# Patient Record
Sex: FEMALE | Race: WHITE | NOT HISPANIC OR LATINO | Employment: UNEMPLOYED | URBAN - NONMETROPOLITAN AREA
[De-identification: names, ages, dates, MRNs, and addresses within clinical notes are randomized per-mention and may not be internally consistent; named-entity substitution may affect disease eponyms.]

---

## 2023-05-01 ENCOUNTER — APPOINTMENT (OUTPATIENT)
Dept: PRIMARY CARE | Facility: CLINIC | Age: 48
End: 2023-05-01
Payer: COMMERCIAL

## 2023-05-01 ENCOUNTER — TELEPHONE (OUTPATIENT)
Dept: PRIMARY CARE | Facility: CLINIC | Age: 48
End: 2023-05-01

## 2023-05-01 RX ORDER — ASPIRIN 81 MG/1
81 TABLET ORAL DAILY
COMMUNITY

## 2023-05-01 RX ORDER — INSULIN LISPRO 100 [IU]/ML
20 INJECTION, SUSPENSION SUBCUTANEOUS
COMMUNITY
End: 2023-09-14 | Stop reason: SDUPTHER

## 2023-05-01 RX ORDER — ATORVASTATIN CALCIUM 80 MG/1
80 TABLET, FILM COATED ORAL DAILY
COMMUNITY
End: 2023-09-13 | Stop reason: SDUPTHER

## 2023-05-01 RX ORDER — METFORMIN HYDROCHLORIDE 500 MG/1
500 TABLET ORAL
COMMUNITY
End: 2023-06-13 | Stop reason: SDUPTHER

## 2023-05-01 RX ORDER — CLOPIDOGREL BISULFATE 75 MG/1
75 TABLET ORAL DAILY
COMMUNITY
End: 2023-06-13 | Stop reason: ALTCHOICE

## 2023-05-01 NOTE — TELEPHONE ENCOUNTER
Transition of Care    [facility]  Discharge diagnosis: Transient Stroke  Discharged to: Home  Discharge date: 4/24/23  Initial Call date: 4/26/23  Spoke with patient/caregiver: Patient  Left Message on date : 4/26/23                                                                  Left Message on date : 4/28/23  Do you need assistance  visits prior to your PCP visit: No  Home health care ordered: Yes  Have you been contacted by home care and have a start of care date: No  Are you taking medications as prescribed at discharge: Yes    Referral to [facility] Pharmacist: No  Patient advised to bring all medications to PCP follow-up appointment.  Patient advised to follow discharge instructions until provider follow-up.  TCM visit date: 5/2/23  TCM provider visit with: Eliana Knowles MD

## 2023-05-02 ENCOUNTER — OFFICE VISIT (OUTPATIENT)
Dept: PRIMARY CARE | Facility: CLINIC | Age: 48
End: 2023-05-02
Payer: COMMERCIAL

## 2023-05-02 VITALS
HEART RATE: 95 BPM | HEIGHT: 63 IN | WEIGHT: 252.8 LBS | SYSTOLIC BLOOD PRESSURE: 180 MMHG | TEMPERATURE: 97.1 F | DIASTOLIC BLOOD PRESSURE: 104 MMHG | BODY MASS INDEX: 44.79 KG/M2 | OXYGEN SATURATION: 97 %

## 2023-05-02 DIAGNOSIS — G45.9 TIA (TRANSIENT ISCHEMIC ATTACK): Primary | ICD-10-CM

## 2023-05-02 DIAGNOSIS — E78.00 HYPERCHOLESTEREMIA: ICD-10-CM

## 2023-05-02 DIAGNOSIS — R93.0 ABNORMAL MRI OF HEAD: ICD-10-CM

## 2023-05-02 DIAGNOSIS — E66.01 MORBID OBESITY WITH BMI OF 40.0-44.9, ADULT (MULTI): ICD-10-CM

## 2023-05-02 DIAGNOSIS — R29.818 SUSPECTED SLEEP APNEA: ICD-10-CM

## 2023-05-02 DIAGNOSIS — I10 HYPERTENSION, UNSPECIFIED TYPE: ICD-10-CM

## 2023-05-02 DIAGNOSIS — E11.65 UNCONTROLLED TYPE 2 DIABETES MELLITUS WITH HYPERGLYCEMIA (MULTI): ICD-10-CM

## 2023-05-02 PROCEDURE — 4010F ACE/ARB THERAPY RXD/TAKEN: CPT | Performed by: INTERNAL MEDICINE

## 2023-05-02 PROCEDURE — 3077F SYST BP >= 140 MM HG: CPT | Performed by: INTERNAL MEDICINE

## 2023-05-02 PROCEDURE — 3046F HEMOGLOBIN A1C LEVEL >9.0%: CPT | Performed by: INTERNAL MEDICINE

## 2023-05-02 PROCEDURE — 3080F DIAST BP >= 90 MM HG: CPT | Performed by: INTERNAL MEDICINE

## 2023-05-02 PROCEDURE — 1036F TOBACCO NON-USER: CPT | Performed by: INTERNAL MEDICINE

## 2023-05-02 PROCEDURE — 99496 TRANSJ CARE MGMT HIGH F2F 7D: CPT | Performed by: INTERNAL MEDICINE

## 2023-05-02 RX ORDER — LOSARTAN POTASSIUM 25 MG/1
25 TABLET ORAL DAILY
COMMUNITY
End: 2023-05-02

## 2023-05-02 RX ORDER — LOSARTAN POTASSIUM 50 MG/1
50 TABLET ORAL DAILY
Qty: 30 TABLET | Refills: 2 | Status: SHIPPED | OUTPATIENT
Start: 2023-05-02 | End: 2023-08-09

## 2023-05-02 ASSESSMENT — ENCOUNTER SYMPTOMS
DIZZINESS: 0
BLOOD IN STOOL: 0
PALPITATIONS: 0
SINUS PAIN: 0
FATIGUE: 0
ABDOMINAL PAIN: 0
DIFFICULTY URINATING: 0
ARTHRALGIAS: 0
SORE THROAT: 0
DIARRHEA: 0
COUGH: 0
FEVER: 0
WHEEZING: 0
UNEXPECTED WEIGHT CHANGE: 0
BRUISES/BLEEDS EASILY: 0
HEADACHES: 0

## 2023-05-02 NOTE — PROGRESS NOTES
Subjective   Patient ID: Sergey South is a 47 y.o. female who presents for New Patient Visit/TCM.    New patient comes today after transition of care patient moved from [place] for long time here in [place] did not have any primary care physician almost more than 10 years  Transition of care  Patient admission history and physical, hospital course, medications, verified and reviewed  Patient contacted after discharge, medications verified comes today for follow-up  Patient admitted to the hospital for left-sided numbness uncontrolled hypertension new onset diabetes hypercholesterolemia  Patient treated for TIA discharged home on aspirin Plavix questionable abnormal MRI questionable MS or arrhythmia awaiting follow-up specialist    Transition of Care     Inpatient facility: [facility]  Discharge diagnosis: Transient Stroke  Discharged to: Home  Discharge date: 4/24/23  Initial Call date: 4/26/23  Spoke with patient/caregiver: Patient  Left Message on date : 4/26/23                                                                  Left Message on date : 4/28/23  Do you need assistance  visits prior to your PCP visit: No  Home health care ordered: Yes  Have you been contacted by home care and have a start of care date: No  Are you taking medications as prescribed at discharge: Yes     Referral to [facility] Pharmacist: No  Patient advised to bring all medications to PCP follow-up appointment.  Patient advised to follow discharge instructions until provider follow-up.  TCM visit date: 5/2/23  TCM provider visit with: Eliana Knowles MD    -TIA continue with aspirin Plavix discontinue Plavix after 21 days continue aspirin daily after that follow-up neurology Dr. Frey  -Abnormal MRI questionable MS referred to neuro immunology Dr. Santa for further recommendation  -Need to rule out dysrhythmias Zio patch placed to follow-up results  -Uncontrolled hypertension needs a goal blood pressure 130/80  "increase losartan to 50 mg daily  - Hypercholesterolemia continues atorvastatin 80 mg daily  - New onset diabetes now on insulin and metformin blood sugar from home range around 200 continue current medication  Referred patient to dietitian  -Morbid obesity counseled about BMI and weight loss follow-up dietitian  - Suspected sleep apnea we will arrange with a sleep eval next appointment  -Never had mammogram or colonoscopy arrange with physical exam in 4 weeks               Review of Systems   Constitutional:  Negative for fatigue, fever and unexpected weight change.   HENT:  Negative for congestion, ear discharge, ear pain, mouth sores, sinus pain and sore throat.    Eyes:  Negative for visual disturbance.   Respiratory:  Negative for cough and wheezing.    Cardiovascular:  Negative for chest pain, palpitations and leg swelling.   Gastrointestinal:  Negative for abdominal pain, blood in stool and diarrhea.   Genitourinary:  Negative for difficulty urinating.   Musculoskeletal:  Negative for arthralgias.   Skin:  Negative for rash.   Neurological:  Negative for dizziness and headaches.   Hematological:  Does not bruise/bleed easily.   Psychiatric/Behavioral:  Negative for behavioral problems.    All other systems reviewed and are negative.      Objective   Lab Results   Component Value Date    HGBA1C 13.2 (A) 04/24/2023      BP (!) 180/104   Pulse 95   Temp 36.2 °C (97.1 °F)   Ht 1.6 m (5' 3\")   Wt 115 kg (252 lb 12.8 oz)   SpO2 97%   BMI 44.78 kg/m²   Lab Results   Component Value Date    WBC CANCELED 04/26/2023    HGB CANCELED 04/26/2023    HCT CANCELED 04/26/2023    PLT CANCELED 04/26/2023    CHOL 139 04/24/2023    TRIG 143 04/24/2023    HDL 18.2 (A) 04/24/2023    ALT CANCELED 04/24/2023    AST CANCELED 04/24/2023    NA CANCELED 04/26/2023    K CANCELED 04/26/2023    CL CANCELED 04/26/2023    CREATININE CANCELED 04/26/2023    BUN CANCELED 04/26/2023    CO2 CANCELED 04/26/2023    TSH 2.08 04/24/2023    INR " CANCELED 04/24/2023    HGBA1C 13.2 (A) 04/24/2023     par   Physical Exam  Vitals and nursing note reviewed.   Constitutional:       Appearance: Normal appearance. She is obese.   HENT:      Head: Normocephalic.      Nose: Nose normal.   Eyes:      Conjunctiva/sclera: Conjunctivae normal.      Pupils: Pupils are equal, round, and reactive to light.   Cardiovascular:      Rate and Rhythm: Regular rhythm.   Pulmonary:      Effort: Pulmonary effort is normal.      Breath sounds: Normal breath sounds.   Abdominal:      General: Abdomen is flat.      Palpations: Abdomen is soft.   Musculoskeletal:      Cervical back: Neck supple.   Skin:     General: Skin is warm.   Neurological:      General: No focal deficit present.      Mental Status: She is oriented to person, place, and time.   Psychiatric:         Mood and Affect: Mood normal.         Assessment/Plan   Sergey was seen today for new patient visit/tcm.  Diagnoses and all orders for this visit:  TIA (transient ischemic attack) (Primary)  Uncontrolled type 2 diabetes mellitus with hyperglycemia (CMS/[facility])  -     Referral to Nutrition Services; Future  Hypertension, unspecified type  -     losartan (Cozaar) 50 mg tablet; Take 1 tablet (50 mg) by mouth once daily.  Hypercholesteremia  Abnormal MRI of head  Suspected sleep apnea  Morbid obesity with BMI of 40.0-44.9, adult (CMS/[facility])  -     Referral to Nutrition Services; Future  Other orders  -     Follow Up In Primary Care; Future   New patient comes today after transition of care patient moved from [place] for long time here in [place] did not have any primary care physician almost more than 10 years  Transition of care  Patient admission history and physical, hospital course, medications, verified and reviewed  Patient contacted after discharge, medications verified comes today for follow-up  Patient admitted to the hospital for left-sided numbness uncontrolled hypertension new onset diabetes  hypercholesterolemia  Patient treated for TIA discharged home on aspirin Plavix questionable abnormal MRI questionable MS or arrhythmia awaiting follow-up specialist    Transition of Care     [facility]  Discharge diagnosis: Transient Stroke  Discharged to: Home  Discharge date: 4/24/23  Initial Call date: 4/26/23  Spoke with patient/caregiver: Patient  Left Message on date : 4/26/23                                                                  Left Message on date : 4/28/23  Do you need assistance  visits prior to your PCP visit: No  Home health care ordered: Yes  Have you been contacted by home care and have a start of care date: No  Are you taking medications as prescribed at discharge: Yes     Referral to [facility] Pharmacist: No  Patient advised to bring all medications to PCP follow-up appointment.  Patient advised to follow discharge instructions until provider follow-up.  TCM visit date: 5/2/23  TCM provider visit with: Eliana Knowles MD    -TIA continue with aspirin Plavix discontinue Plavix after 21 days continue aspirin daily after that follow-up neurology Dr. Frey  -Abnormal MRI questionable MS referred to neuro immunology Dr. Santa for further recommendation  -Need to rule out dysrhythmias Zio patch placed to follow-up results  -Uncontrolled hypertension needs a goal blood pressure 130/80 increase losartan to 50 mg daily  - Hypercholesterolemia continues atorvastatin 80 mg daily  - New onset diabetes now on insulin and metformin blood sugar from home range around 200 continue current medication  Referred patient to dietitian  -Morbid obesity counseled about BMI and weight loss follow-up dietitian  - Suspected sleep apnea we will arrange with a sleep eval next appointment  -Never had mammogram or colonoscopy arrange with physical exam in 4 weeks

## 2023-05-18 ENCOUNTER — APPOINTMENT (OUTPATIENT)
Dept: LAB | Facility: LAB | Age: 48
End: 2023-05-18
Payer: COMMERCIAL

## 2023-05-18 LAB
ALANINE AMINOTRANSFERASE (SGPT) (U/L) IN SER/PLAS: 23 U/L (ref 7–45)
ALBUMIN (G/DL) IN SER/PLAS: 4.3 G/DL (ref 3.4–5)
ALKALINE PHOSPHATASE (U/L) IN SER/PLAS: 98 U/L (ref 33–110)
ANION GAP IN SER/PLAS: 15 MMOL/L (ref 10–20)
ANTI-SARS-COV-2 IGG AND IGM: REACTIVE
ASPARTATE AMINOTRANSFERASE (SGOT) (U/L) IN SER/PLAS: 18 U/L (ref 9–39)
BASOPHILS (10*3/UL) IN BLOOD BY AUTOMATED COUNT: 0.1 X10E9/L (ref 0–0.1)
BASOPHILS/100 LEUKOCYTES IN BLOOD BY AUTOMATED COUNT: 0.8 % (ref 0–2)
BILIRUBIN DIRECT (MG/DL) IN SER/PLAS: 0.1 MG/DL (ref 0–0.3)
BILIRUBIN TOTAL (MG/DL) IN SER/PLAS: 0.3 MG/DL (ref 0–1.2)
CALCIDIOL (25 OH VITAMIN D3) (NG/ML) IN SER/PLAS: 48 NG/ML
CALCIUM (MG/DL) IN SER/PLAS: 9.7 MG/DL (ref 8.6–10.6)
CARBON DIOXIDE, TOTAL (MMOL/L) IN SER/PLAS: 25 MMOL/L (ref 21–32)
CHLORIDE (MMOL/L) IN SER/PLAS: 104 MMOL/L (ref 98–107)
COBALAMIN (VITAMIN B12) (PG/ML) IN SER/PLAS: 611 PG/ML (ref 211–911)
CREATININE (MG/DL) IN SER/PLAS: 0.67 MG/DL (ref 0.5–1.05)
EOSINOPHILS (10*3/UL) IN BLOOD BY AUTOMATED COUNT: 0.57 X10E9/L (ref 0–0.7)
EOSINOPHILS/100 LEUKOCYTES IN BLOOD BY AUTOMATED COUNT: 4.3 % (ref 0–6)
ERYTHROCYTE DISTRIBUTION WIDTH (RATIO) BY AUTOMATED COUNT: 12.8 % (ref 11.5–14.5)
ERYTHROCYTE MEAN CORPUSCULAR HEMOGLOBIN CONCENTRATION (G/DL) BY AUTOMATED: 32.5 G/DL (ref 32–36)
ERYTHROCYTE MEAN CORPUSCULAR VOLUME (FL) BY AUTOMATED COUNT: 90 FL (ref 80–100)
ERYTHROCYTES (10*6/UL) IN BLOOD BY AUTOMATED COUNT: 4.9 X10E12/L (ref 4–5.2)
GFR FEMALE: >90 ML/MIN/1.73M2
GLUCOSE (MG/DL) IN SER/PLAS: 134 MG/DL (ref 74–99)
HEMATOCRIT (%) IN BLOOD BY AUTOMATED COUNT: 44 % (ref 36–46)
HEMOGLOBIN (G/DL) IN BLOOD: 14.3 G/DL (ref 12–16)
HEPATITIS B VIRUS CORE AB (PRESENCE) IN SER/PLAS BY IMM: NONREACTIVE
HEPATITIS B VIRUS SURFACE AB (MIU/ML) IN SERUM: 7.4 MIU/ML
HEPATITIS B VIRUS SURFACE AG PRESENCE IN SERUM: NONREACTIVE
HEPATITIS C VIRUS AB PRESENCE IN SERUM: NONREACTIVE
HIV 1/ 2 AG/AB SCREEN: NONREACTIVE
IGA (MG/DL) IN SER/PLAS: 401 MG/DL (ref 70–400)
IGG (MG/DL) IN SER/PLAS: 847 MG/DL (ref 700–1600)
IGM (MG/DL) IN SER/PLAS: 50 MG/DL (ref 40–230)
IMMATURE GRANULOCYTES/100 LEUKOCYTES IN BLOOD BY AUTOMATED COUNT: 0.2 % (ref 0–0.9)
LEUKOCYTES (10*3/UL) IN BLOOD BY AUTOMATED COUNT: 13.2 X10E9/L (ref 4.4–11.3)
LYMPHOCYTES (10*3/UL) IN BLOOD BY AUTOMATED COUNT: 3.61 X10E9/L (ref 1.2–4.8)
LYMPHOCYTES/100 LEUKOCYTES IN BLOOD BY AUTOMATED COUNT: 27.3 % (ref 13–44)
MONOCYTES (10*3/UL) IN BLOOD BY AUTOMATED COUNT: 0.66 X10E9/L (ref 0.1–1)
MONOCYTES/100 LEUKOCYTES IN BLOOD BY AUTOMATED COUNT: 5 % (ref 2–10)
NEUTROPHILS (10*3/UL) IN BLOOD BY AUTOMATED COUNT: 8.27 X10E9/L (ref 1.2–7.7)
NEUTROPHILS/100 LEUKOCYTES IN BLOOD BY AUTOMATED COUNT: 62.4 % (ref 40–80)
NRBC (PER 100 WBCS) BY AUTOMATED COUNT: 0 /100 WBC (ref 0–0)
PLATELETS (10*3/UL) IN BLOOD AUTOMATED COUNT: 382 X10E9/L (ref 150–450)
POTASSIUM (MMOL/L) IN SER/PLAS: 4.4 MMOL/L (ref 3.5–5.3)
PROTEIN TOTAL: 7 G/DL (ref 6.4–8.2)
SARS-COV-2 SPIKE TOTAL AB INDEX-COMMENT: ABNORMAL
SODIUM (MMOL/L) IN SER/PLAS: 140 MMOL/L (ref 136–145)
UREA NITROGEN (MG/DL) IN SER/PLAS: 15 MG/DL (ref 6–23)
VARICELLA ZOSTER IGG: POSITIVE

## 2023-05-19 LAB — ANTI-NUCLEAR ANTIBODY (ANA): NEGATIVE

## 2023-05-21 LAB
NIL(NEG) CONTROL SPOT COUNT: NORMAL
PANEL A SPOT COUNT: 0
PANEL B SPOT COUNT: 1
POS CONTROL SPOT COUNT: NORMAL
T-SPOT. TB INTERPRETATION: NEGATIVE

## 2023-05-22 LAB — COPPER: 106.7 UG/DL (ref 80–155)

## 2023-05-23 LAB — METHYLMALONIC ACID, S: 0.14 UMOL/L (ref 0–0.4)

## 2023-05-25 LAB
ANGIOTENSIN CONVERTING ENZYME: 25 U/L (ref 16–85)
JC VIRUS AB (SJC): POSITIVE
JCV INDEX VALUE (SJC): 2.76

## 2023-06-07 PROBLEM — G45.9 TIA (TRANSIENT ISCHEMIC ATTACK): Status: ACTIVE | Noted: 2023-06-07

## 2023-06-07 PROBLEM — I10 HTN (HYPERTENSION): Status: ACTIVE | Noted: 2023-06-07

## 2023-06-07 PROBLEM — E11.65 POORLY CONTROLLED TYPE 2 DIABETES MELLITUS (MULTI): Status: ACTIVE | Noted: 2023-06-07

## 2023-06-07 RX ORDER — ERGOCALCIFEROL 1.25 MG/1
1 CAPSULE ORAL WEEKLY
COMMUNITY
Start: 2023-05-18 | End: 2023-11-08 | Stop reason: SDUPTHER

## 2023-06-07 RX ORDER — BLOOD-GLUCOSE METER
EACH MISCELLANEOUS
COMMUNITY
Start: 2023-04-25

## 2023-06-13 ENCOUNTER — OFFICE VISIT (OUTPATIENT)
Dept: PRIMARY CARE | Facility: CLINIC | Age: 48
End: 2023-06-13
Payer: COMMERCIAL

## 2023-06-13 VITALS
OXYGEN SATURATION: 97 % | HEIGHT: 63 IN | WEIGHT: 254.4 LBS | BODY MASS INDEX: 45.07 KG/M2 | DIASTOLIC BLOOD PRESSURE: 78 MMHG | SYSTOLIC BLOOD PRESSURE: 132 MMHG | HEART RATE: 94 BPM | TEMPERATURE: 97.1 F

## 2023-06-13 DIAGNOSIS — E78.00 HYPERCHOLESTEREMIA: Chronic | ICD-10-CM

## 2023-06-13 DIAGNOSIS — G45.9 TIA (TRANSIENT ISCHEMIC ATTACK): ICD-10-CM

## 2023-06-13 DIAGNOSIS — E11.65 UNCONTROLLED TYPE 2 DIABETES MELLITUS WITH HYPERGLYCEMIA (MULTI): Primary | ICD-10-CM

## 2023-06-13 DIAGNOSIS — E66.01 MORBID OBESITY WITH BMI OF 40.0-44.9, ADULT (MULTI): Chronic | ICD-10-CM

## 2023-06-13 DIAGNOSIS — R29.818 SUSPECTED SLEEP APNEA: Chronic | ICD-10-CM

## 2023-06-13 DIAGNOSIS — I10 HYPERTENSION, UNSPECIFIED TYPE: Chronic | ICD-10-CM

## 2023-06-13 DIAGNOSIS — R93.0 ABNORMAL MRI OF HEAD: Chronic | ICD-10-CM

## 2023-06-13 PROCEDURE — 99214 OFFICE O/P EST MOD 30 MIN: CPT | Performed by: INTERNAL MEDICINE

## 2023-06-13 PROCEDURE — 3078F DIAST BP <80 MM HG: CPT | Performed by: INTERNAL MEDICINE

## 2023-06-13 PROCEDURE — 3075F SYST BP GE 130 - 139MM HG: CPT | Performed by: INTERNAL MEDICINE

## 2023-06-13 PROCEDURE — 4010F ACE/ARB THERAPY RXD/TAKEN: CPT | Performed by: INTERNAL MEDICINE

## 2023-06-13 PROCEDURE — 1036F TOBACCO NON-USER: CPT | Performed by: INTERNAL MEDICINE

## 2023-06-13 PROCEDURE — 3008F BODY MASS INDEX DOCD: CPT | Performed by: INTERNAL MEDICINE

## 2023-06-13 PROCEDURE — 3046F HEMOGLOBIN A1C LEVEL >9.0%: CPT | Performed by: INTERNAL MEDICINE

## 2023-06-13 RX ORDER — METFORMIN HYDROCHLORIDE 1000 MG/1
1000 TABLET ORAL
Qty: 180 TABLET | Refills: 3 | Status: SHIPPED | OUTPATIENT
Start: 2023-06-13 | End: 2024-05-23 | Stop reason: SDUPTHER

## 2023-06-13 ASSESSMENT — ENCOUNTER SYMPTOMS
DIARRHEA: 0
SINUS PAIN: 0
COUGH: 0
SORE THROAT: 0
PALPITATIONS: 0
LOSS OF SENSATION IN FEET: 0
HYPERTENSION: 1
BLOOD IN STOOL: 0
DIFFICULTY URINATING: 0
DEPRESSION: 1
BRUISES/BLEEDS EASILY: 0
ARTHRALGIAS: 0
OCCASIONAL FEELINGS OF UNSTEADINESS: 1
FEVER: 0
DIZZINESS: 0
FATIGUE: 0
UNEXPECTED WEIGHT CHANGE: 0
WHEEZING: 0
HEADACHES: 0
ABDOMINAL PAIN: 0

## 2023-06-13 ASSESSMENT — PATIENT HEALTH QUESTIONNAIRE - PHQ9
2. FEELING DOWN, DEPRESSED OR HOPELESS: MORE THAN HALF THE DAYS
SUM OF ALL RESPONSES TO PHQ9 QUESTIONS 1 AND 2: 2
1. LITTLE INTEREST OR PLEASURE IN DOING THINGS: NOT AT ALL
10. IF YOU CHECKED OFF ANY PROBLEMS, HOW DIFFICULT HAVE THESE PROBLEMS MADE IT FOR YOU TO DO YOUR WORK, TAKE CARE OF THINGS AT HOME, OR GET ALONG WITH OTHER PEOPLE: SOMEWHAT DIFFICULT

## 2023-06-13 ASSESSMENT — COLUMBIA-SUICIDE SEVERITY RATING SCALE - C-SSRS
2. HAVE YOU ACTUALLY HAD ANY THOUGHTS OF KILLING YOURSELF?: NO
6. HAVE YOU EVER DONE ANYTHING, STARTED TO DO ANYTHING, OR PREPARED TO DO ANYTHING TO END YOUR LIFE?: NO
1. IN THE PAST MONTH, HAVE YOU WISHED YOU WERE DEAD OR WISHED YOU COULD GO TO SLEEP AND NOT WAKE UP?: NO

## 2023-06-13 NOTE — PROGRESS NOTES
Subjective   Patient ID: Sergey South is a 47 y.o. female who presents for Annual Exam, Diabetes, Hypertension, Hyperlipidemia, Results (MRI, BW), Carpal Tunnel (Patient feels might have), Menopause (Referral GYN), and Sleep Study (Referral).    -TIA, no recurrence now patient off Plavix after 21 days continue with aspirin daily as recommended  - Abnormal MRI seen by Dr. Santa repeat MRI showed significant changes possible underlying demyelinating disease awaiting follow-up continue with aspirin daily  - Diabetes improving blood sugar from home range 1 30-1 49 to titrate up metformin to take 1 g twice a day  - Hypercholesterolemia continues atorvastatin  - Hypertension controlled  - Suspected sleep apnea refer patient to sleep clinic as recommended  - Holter monitor did not work patient lost it after 1 day need to repeat again Zio patch sent new order to cardiology in [place]  --Morbid obesity counseled about BMI and weight loss follow-up dietitian  --Need to proceed with mammogram arrange with screening colonoscopy when patient stable next appointment      Diabetes  Pertinent negatives for hypoglycemia include no dizziness or headaches. Pertinent negatives for diabetes include no chest pain and no fatigue.   Hypertension  Pertinent negatives include no chest pain, headaches or palpitations.   Hyperlipidemia  Pertinent negatives include no chest pain.   Carpal Tunnel Syndrome           Review of Systems   Constitutional:  Negative for fatigue, fever and unexpected weight change.   HENT:  Negative for congestion, ear discharge, ear pain, mouth sores, sinus pain and sore throat.    Eyes:  Negative for visual disturbance.   Respiratory:  Negative for cough and wheezing.    Cardiovascular:  Negative for chest pain, palpitations and leg swelling.   Gastrointestinal:  Negative for abdominal pain, blood in stool and diarrhea.   Genitourinary:  Negative for difficulty urinating.   Musculoskeletal:  Negative for arthralgias.  "  Skin:  Negative for rash.   Neurological:  Negative for dizziness and headaches.   Hematological:  Does not bruise/bleed easily.   Psychiatric/Behavioral:  Negative for behavioral problems.    All other systems reviewed and are negative.      Objective   Lab Results   Component Value Date    HGBA1C 13.2 (A) 04/24/2023      Lab Results   Component Value Date    WBC 13.2 (H) 05/18/2023    HGB 14.3 05/18/2023    HCT 44.0 05/18/2023     05/18/2023    CHOL 139 04/24/2023    TRIG 143 04/24/2023    HDL 18.2 (A) 04/24/2023    ALT 23 05/18/2023    AST 18 05/18/2023     05/18/2023    K 4.4 05/18/2023     05/18/2023    CREATININE 0.67 05/18/2023    BUN 15 05/18/2023    CO2 25 05/18/2023    TSH 2.08 04/24/2023    INR CANCELED 04/24/2023    HGBA1C 13.2 (A) 04/24/2023     par   /78   Pulse 94   Temp 36.2 °C (97.1 °F)   Ht 1.6 m (5' 3\")   Wt 115 kg (254 lb 6.4 oz)   SpO2 97%   BMI 45.06 kg/m²     Physical Exam  Vitals and nursing note reviewed.   Constitutional:       Appearance: Normal appearance.   HENT:      Head: Normocephalic.      Nose: Nose normal.   Eyes:      Conjunctiva/sclera: Conjunctivae normal.      Pupils: Pupils are equal, round, and reactive to light.   Cardiovascular:      Rate and Rhythm: Regular rhythm.   Pulmonary:      Effort: Pulmonary effort is normal.      Breath sounds: Normal breath sounds.   Abdominal:      General: Abdomen is flat.      Palpations: Abdomen is soft.   Musculoskeletal:      Cervical back: Neck supple.   Skin:     General: Skin is warm.   Neurological:      General: No focal deficit present.      Mental Status: She is oriented to person, place, and time.   Psychiatric:         Mood and Affect: Mood normal.         Assessment/Plan   Sergey was seen today for annual exam, diabetes, hypertension, hyperlipidemia, results, carpal tunnel, menopause and sleep study.  Diagnoses and all orders for this visit:  Uncontrolled type 2 diabetes mellitus with " hyperglycemia (CMS/HCC) (Primary)  -     metFORMIN (Glucophage) 1,000 mg tablet; Take 1 tablet (1,000 mg) by mouth 2 times a day with meals.  TIA (transient ischemic attack)  -     Holter or Event Cardiac Monitor; Future  Hypertension, unspecified type  Hypercholesteremia  Abnormal MRI of head  Suspected sleep apnea  -     Referral to Adult Sleep Medicine; Future  Morbid obesity with BMI of 40.0-44.9, adult (CMS/HCC)  Other orders  -     Follow Up In Primary Care; Future   -TIA, no recurrence now patient off Plavix after 21 days continue with aspirin daily as recommended  - Abnormal MRI seen by Dr. Santa repeat MRI showed significant changes possible underlying demyelinating disease awaiting follow-up continue with aspirin daily  - Diabetes improving blood sugar from home range 1 30-1 49 to titrate up metformin to take 1 g twice a day  - Hypercholesterolemia continues atorvastatin  - Hypertension controlled  - Suspected sleep apnea refer patient to sleep clinic as recommended  - Holter monitor did not work patient lost it after 1 day need to repeat again Zio patch sent new order to cardiology in [place]  --Morbid obesity counseled about BMI and weight loss follow-up dietitian  --Need to proceed with mammogram arrange with screening colonoscopy when patient stable next appointment

## 2023-08-09 DIAGNOSIS — I10 HYPERTENSION, UNSPECIFIED TYPE: ICD-10-CM

## 2023-08-09 RX ORDER — LOSARTAN POTASSIUM 50 MG/1
50 TABLET ORAL DAILY
Qty: 90 TABLET | Refills: 1 | Status: SHIPPED | OUTPATIENT
Start: 2023-08-09 | End: 2024-02-06

## 2023-09-13 ENCOUNTER — LAB (OUTPATIENT)
Dept: LAB | Facility: LAB | Age: 48
End: 2023-09-13
Payer: COMMERCIAL

## 2023-09-13 ENCOUNTER — OFFICE VISIT (OUTPATIENT)
Dept: PRIMARY CARE | Facility: CLINIC | Age: 48
End: 2023-09-13
Payer: COMMERCIAL

## 2023-09-13 VITALS
BODY MASS INDEX: 44.46 KG/M2 | HEART RATE: 99 BPM | SYSTOLIC BLOOD PRESSURE: 136 MMHG | WEIGHT: 251 LBS | TEMPERATURE: 95.1 F | DIASTOLIC BLOOD PRESSURE: 80 MMHG | OXYGEN SATURATION: 97 %

## 2023-09-13 DIAGNOSIS — F32.A DEPRESSION, UNSPECIFIED DEPRESSION TYPE: ICD-10-CM

## 2023-09-13 DIAGNOSIS — M79.10 MYALGIA: Primary | ICD-10-CM

## 2023-09-13 DIAGNOSIS — Z23 FLU VACCINE NEED: ICD-10-CM

## 2023-09-13 DIAGNOSIS — G45.9 TIA (TRANSIENT ISCHEMIC ATTACK): ICD-10-CM

## 2023-09-13 DIAGNOSIS — M79.10 MYALGIA: ICD-10-CM

## 2023-09-13 DIAGNOSIS — E11.65 POORLY CONTROLLED TYPE 2 DIABETES MELLITUS (MULTI): ICD-10-CM

## 2023-09-13 LAB
POC FINGERSTICK BLOOD GLUCOSE: 101 MG/DL (ref 70–100)
POC HEMOGLOBIN A1C: 6 % (ref 4.2–6.5)
SEDIMENTATION RATE, ERYTHROCYTE: 35 MM/H (ref 0–20)

## 2023-09-13 PROCEDURE — 3075F SYST BP GE 130 - 139MM HG: CPT | Performed by: INTERNAL MEDICINE

## 2023-09-13 PROCEDURE — 99214 OFFICE O/P EST MOD 30 MIN: CPT | Performed by: INTERNAL MEDICINE

## 2023-09-13 PROCEDURE — 83036 HEMOGLOBIN GLYCOSYLATED A1C: CPT | Performed by: INTERNAL MEDICINE

## 2023-09-13 PROCEDURE — 86141 C-REACTIVE PROTEIN HS: CPT

## 2023-09-13 PROCEDURE — 36415 COLL VENOUS BLD VENIPUNCTURE: CPT

## 2023-09-13 PROCEDURE — 86431 RHEUMATOID FACTOR QUANT: CPT

## 2023-09-13 PROCEDURE — 90471 IMMUNIZATION ADMIN: CPT | Performed by: INTERNAL MEDICINE

## 2023-09-13 PROCEDURE — 86200 CCP ANTIBODY: CPT

## 2023-09-13 PROCEDURE — 82962 GLUCOSE BLOOD TEST: CPT | Performed by: INTERNAL MEDICINE

## 2023-09-13 PROCEDURE — 90686 IIV4 VACC NO PRSV 0.5 ML IM: CPT | Performed by: INTERNAL MEDICINE

## 2023-09-13 PROCEDURE — 86038 ANTINUCLEAR ANTIBODIES: CPT

## 2023-09-13 PROCEDURE — 3008F BODY MASS INDEX DOCD: CPT | Performed by: INTERNAL MEDICINE

## 2023-09-13 PROCEDURE — 3046F HEMOGLOBIN A1C LEVEL >9.0%: CPT | Performed by: INTERNAL MEDICINE

## 2023-09-13 PROCEDURE — 86225 DNA ANTIBODY NATIVE: CPT

## 2023-09-13 PROCEDURE — 3079F DIAST BP 80-89 MM HG: CPT | Performed by: INTERNAL MEDICINE

## 2023-09-13 PROCEDURE — 86039 ANTINUCLEAR ANTIBODIES (ANA): CPT

## 2023-09-13 PROCEDURE — 86235 NUCLEAR ANTIGEN ANTIBODY: CPT

## 2023-09-13 PROCEDURE — 4010F ACE/ARB THERAPY RXD/TAKEN: CPT | Performed by: INTERNAL MEDICINE

## 2023-09-13 PROCEDURE — 1036F TOBACCO NON-USER: CPT | Performed by: INTERNAL MEDICINE

## 2023-09-13 PROCEDURE — 85652 RBC SED RATE AUTOMATED: CPT

## 2023-09-13 RX ORDER — DESVENLAFAXINE 50 MG/1
50 TABLET, EXTENDED RELEASE ORAL DAILY
Qty: 30 TABLET | Refills: 11 | Status: SHIPPED | OUTPATIENT
Start: 2023-09-13 | End: 2024-02-19 | Stop reason: SDUPTHER

## 2023-09-13 RX ORDER — SAME BUTANEDISULFONATE/BETAINE 400-600 MG
POWDER IN PACKET (EA) ORAL
COMMUNITY

## 2023-09-13 RX ORDER — ATORVASTATIN CALCIUM 80 MG/1
80 TABLET, FILM COATED ORAL DAILY
Qty: 90 TABLET | Refills: 1 | Status: SHIPPED | OUTPATIENT
Start: 2023-09-13 | End: 2024-03-18

## 2023-09-13 ASSESSMENT — ENCOUNTER SYMPTOMS
PALPITATIONS: 0
SORE THROAT: 0
TREMORS: 1
ARTHRALGIAS: 1
DIARRHEA: 0
SPEECH DIFFICULTY: 1
HEADACHES: 0
BRUISES/BLEEDS EASILY: 0
FATIGUE: 1
SINUS PAIN: 0
DIZZINESS: 1
ABDOMINAL PAIN: 0
DIFFICULTY URINATING: 0
FEVER: 0
WHEEZING: 0
BLOOD IN STOOL: 0
WEAKNESS: 1
COUGH: 0
UNEXPECTED WEIGHT CHANGE: 0

## 2023-09-13 NOTE — PROGRESS NOTES
Subjective   Patient ID: Sergey South is a 47 y.o. female who presents for skin break out (On face every winter), Flu Vaccine, Diabetes, Hyperlipidemia, and questions regarding diagnosis of having MS (Having symptoms of MS).     -Patient seen by neurology Dr. Santa had more testing awaiting follow-up to exclude underlying MS  -Suspected sleep apnea had sleep test awaiting follow-up sleep clinic  -Patient had Holter monitor follow-up cardiology for further recommendation  -Patient came with multiple questions copy in the chart depression feet and hand numbness muscle weakness pain difficulty remembering things menopausal symptoms  -Patient counseled about menopause, depression  Possible autoimmune disease  - Obtain autoimmune profile  - Depression started on Pristiq 50 mg reevaluate in 4 weeks  - TIA, no recurrence now patient off Plavix after 21 days continue with aspirin daily as recommended  -Diabetes continue with metformin 1 g twice a day continue low-carb diet  - Hypercholesterolemia continues atorvastatin  - Hypertension controlled  ---Need to proceed with mammogram arrange with screening colonoscopy when patient stable next appointment  Follow-up 4 weeks         Diabetes  Hypoglycemia symptoms include dizziness, speech difficulty and tremors. Pertinent negatives for hypoglycemia include no headaches. Associated symptoms include fatigue and weakness. Pertinent negatives for diabetes include no chest pain.   Hyperlipidemia  Pertinent negatives include no chest pain.          Review of Systems   Constitutional:  Positive for fatigue. Negative for fever and unexpected weight change.   HENT:  Negative for congestion, ear discharge, ear pain, mouth sores, sinus pain and sore throat.    Eyes:  Negative for visual disturbance.   Respiratory:  Negative for cough and wheezing.    Cardiovascular:  Negative for chest pain, palpitations and leg swelling.   Gastrointestinal:  Negative for abdominal pain, blood in stool  and diarrhea.   Genitourinary:  Negative for difficulty urinating.   Musculoskeletal:  Positive for arthralgias.   Skin:  Negative for rash.   Neurological:  Positive for dizziness, tremors, speech difficulty and weakness. Negative for headaches.   Hematological:  Does not bruise/bleed easily.   Psychiatric/Behavioral:  Negative for behavioral problems.    All other systems reviewed and are negative.      Objective   Lab Results   Component Value Date    HGBA1C 6.0 09/13/2023      /80   Pulse 99   Temp 35.1 °C (95.1 °F)   Wt 114 kg (251 lb)   SpO2 97%   BMI 44.46 kg/m²     Physical Exam  Vitals and nursing note reviewed.   Constitutional:       Appearance: Normal appearance.   HENT:      Head: Normocephalic.      Nose: Nose normal.   Eyes:      Conjunctiva/sclera: Conjunctivae normal.      Pupils: Pupils are equal, round, and reactive to light.   Cardiovascular:      Rate and Rhythm: Regular rhythm.   Pulmonary:      Effort: Pulmonary effort is normal.      Breath sounds: Normal breath sounds.   Abdominal:      General: Abdomen is flat.      Palpations: Abdomen is soft.   Musculoskeletal:      Cervical back: Neck supple.   Skin:     General: Skin is warm.   Neurological:      General: No focal deficit present.      Mental Status: She is oriented to person, place, and time.   Psychiatric:         Mood and Affect: Mood normal.         Assessment/Plan   Sergey was seen today for skin break out, flu vaccine, diabetes, hyperlipidemia and questions regarding diagnosis of having ms.  Diagnoses and all orders for this visit:  Myalgia (Primary)  -     C-Reactive Protein, High Sensitivity; Future  -     Rheumatoid Factor; Future  -     Citrulline Antibody, IgG; Future  -     JANAE with Reflex to KAREN; Future  -     Sedimentation Rate; Future  TIA (transient ischemic attack)  -     atorvastatin (Lipitor) 80 mg tablet; Take 1 tablet (80 mg) by mouth once daily.  Poorly controlled type 2 diabetes mellitus (CMS/HCC)  -      POCT fingerstick glucose manually resulted  -     POCT glycosylated hemoglobin (Hb A1C) manually resulted  Flu vaccine need  -     Flu vaccine (IIV4) age 6 months and greater, preservative free  Depression, unspecified depression type  -     desvenlafaxine (Pristiq) 50 mg 24 hr tablet; Take 1 tablet (50 mg) by mouth once daily. Do not crush, chew, or split.  Other orders  -     Follow Up In Primary Care  -     Follow Up In Primary Care - Established; Future   Patient seen by neurology Dr. Santa had more testing awaiting follow-up to exclude underlying MS  -Suspected sleep apnea had sleep test awaiting follow-up sleep clinic  -Patient had Holter monitor follow-up cardiology for further recommendation  -Patient came with multiple questions copy in the chart depression feet and hand numbness muscle weakness pain difficulty remembering things menopausal symptoms  -Patient counseled about menopause, depression  Possible autoimmune disease  - Obtain autoimmune profile  - Depression started on Pristiq 50 mg reevaluate in 4 weeks  - TIA, no recurrence now patient off Plavix after 21 days continue with aspirin daily as recommended  -Diabetes continue with metformin 1 g twice a day continue low-carb diet  - Hypercholesterolemia continues atorvastatin  - Hypertension controlled  ---Need to proceed with mammogram arrange with screening colonoscopy when patient stable next appointment  Follow-up 4 weeks

## 2023-09-14 DIAGNOSIS — Z79.4 TYPE 2 DIABETES MELLITUS WITHOUT COMPLICATION, WITH LONG-TERM CURRENT USE OF INSULIN (MULTI): ICD-10-CM

## 2023-09-14 DIAGNOSIS — E11.9 TYPE 2 DIABETES MELLITUS WITHOUT COMPLICATION, WITH LONG-TERM CURRENT USE OF INSULIN (MULTI): ICD-10-CM

## 2023-09-14 LAB
ANA PATTERN: ABNORMAL
ANA TITER: ABNORMAL
ANTI-CENTROMERE: <0.2 AI
ANTI-CHROMATIN: <0.2 AI
ANTI-DNA (DS): 1 IU/ML
ANTI-JO-1 IGG: <0.2 AI
ANTI-NUCLEAR ANTIBODY (ANA): POSITIVE
ANTI-RIBOSOMAL P: <0.2 AI
ANTI-RNP: <0.2 AI
ANTI-SCL-70: <0.2 AI
ANTI-SM/RNP: <0.2 AI
ANTI-SM: <0.2 AI
ANTI-SSA: 0.2 AI
ANTI-SSB: <0.2 AI
C REACTIVE PROTEIN (MG/L) IN SER/PLAS BY HIGH SENSIT: 5.3 MG/L
CITRULLINE ANTIBODY, IGG: <1 U/ML
RHEUMATOID FACTOR (IU/ML) IN SERUM OR PLASMA: <10 IU/ML (ref 0–15)

## 2023-09-14 RX ORDER — INSULIN LISPRO 100 [IU]/ML
20 INJECTION, SUSPENSION SUBCUTANEOUS
Qty: 40 ML | Refills: 0 | Status: SHIPPED | OUTPATIENT
Start: 2023-09-14 | End: 2023-10-16 | Stop reason: WASHOUT

## 2023-09-18 DIAGNOSIS — M32.9 LUPUS (MULTI): Primary | ICD-10-CM

## 2023-10-09 PROBLEM — E66.01 MORBID OBESITY WITH BMI OF 40.0-44.9, ADULT (MULTI): Status: ACTIVE | Noted: 2023-10-09

## 2023-10-09 PROBLEM — G47.33 OBSTRUCTIVE SLEEP APNEA: Status: ACTIVE | Noted: 2023-10-09

## 2023-10-09 PROBLEM — J30.2 SEASONAL ALLERGIES: Status: ACTIVE | Noted: 2023-10-09

## 2023-10-09 NOTE — PATIENT INSTRUCTIONS
"Thank you for coming to the Sleep Medicine Clinic today! Your sleep medicine provider today was: Mere Arroyo MD Below is a summary of your treatment plan, patient education, other important information, and our contact numbers.      TREATMENT PLAN     - Get the following sleep study scheduled: PAP titration study  - Please read the \"Patient Education\" section below and implement instructions, recommendations, lifestyle changes, tips, reminders, strategies, techniques, and supportive management.   - If not yet done, please sign up for [facility] to make a future schedule, send prescription requests, or send messages.    Additional patient handouts given today:   None    Referrals:  We are referring you to the following:   None    Follow-up Appointment:   Follow-up 2-3 weeks after testing    PATIENT EDUCATION     Obstructive Sleep Apnea (BRIE) is a sleep disorder where your upper airway muscles relax during sleep and the airway intermittently and repetitively narrows and collapses leading to partially blocked airway (hypopnea) or completely blocked airway (apnea) which, in turn, can disrupt breathing in sleep, lower oxygen levels while you sleep and cause night time wakings. Because both apnea and hypopnea may cause higher carbon dioxide or low oxygen levels, untreated BRIE can lead to heart arrhythmia, elevation of blood pressure, and make it harder for the body to consolidate memory and facilitate metabolism (leading to higher blood sugars at night). Frequent partial arousals occur during sleep resulting in sleep deprivation and daytime sleepiness. BRIE is associated with an increased risk of cardiovascular disease, stroke, hypertension, and insulin resistance. Moreover, untreated BRIE with excessive daytime sleepiness can increase the risk of motor vehicular accidents.    Some conservative strategies for BRIE regardless of BRIE severity are:   Positional therapy - Avoid sleeping on your back.   Healthy diet and regular " exercise to optimize weight is highly encouraged.   Avoid alcohol late in the evening and sedative-hypnotics as these substances can make sleep apnea worse.   Improve breathing through the nose with intranasal steroid spray, saline rinse, or antihistamines    Safety: Avoid driving vehicle and operating heavy equipment while sleepy. Drowsy driving may lead to life-threatening motor vehicle accidents. A person driving while sleepy is 5 times more likely to have an accident. If you feel sleepy, pull over and take a short power nap (sleep for less than 30 minutes). Otherwise, ask somebody to drive you.    Treatment options for sleep apnea include weight management, positional therapy, Positive Airway Therapy (PAP) therapy, oral appliance therapy, hypoglossal nerve stimulator (Inspire) and select airway surgeries.    Sleep Testing for sleep apnea: The best and ideal way to check out if you have sleep apnea is to do an overnight sleep study in the sleep laboratory. Alternatively, a home sleep apnea test can also be done depending on your insurance and risk factors.     If you are having a home sleep apnea test, kindly allot 1 hour during pickup of the testing kit as you will have to complete paperwork and listen to the sleep technician for in-person on-the-spot demonstration and instructions on how to hook up the testing kit at home. Do the test for 1 day and start off with sleeping on your back. If you sleep on your side in the middle of night or you have always been a side or stomach-sleeper, it is ok as long as you have some time on your back and off-back.     If you are having an overnight in-lab sleep study, please make sure to bring toiletries, a comfy pillow, additional warm blankets, and any nighttime medications (include as-needed inhaler, pain pill, etc) that you may regularly take. Also, be sure to eat dinner before you arrive as we generally do not provide meals inside the sleep testing center. Lastly, in  order to fall asleep faster in the sleep testing center, we advise patients to wake up 2 hours earlier on the morning of scheduled testing and avoid napping 2 days prior testing. Sometimes, your sleep provider may prescribe a sleep aid to be taken at lights out in the sleep testing center. If you are taking a sleep aid, consider having somebody pick you up after the sleep testing.    Overnight sleep studies may be scheduled on a weekday or weekend. We also perform daytime testing for shift workers on a case-by-case basis.    Once you have booked an appointment to do the sleep study, please contact my office for follow-up visit to discuss results.    On the other hand, if you have any of the following, please consider calling the sleep testing center to RESCHEDULE your sleep study appointment:  If you tested positive for COVID within 10 days of your sleep study appointment.  If you were exposed to somebody who was confirmed for COVID within 10 days of your sleep study appointment  If you have fever>100F or any acute symptoms that you think will lead to poor sleep during testing (e.g. new or worsening stuffy nose not relieved by steroid nasal spray)    You can also go to the following EDUCATION WEBSITES for further information:   American Academy of Sleep Medicine http://sleepeducation.org  National Sleep Foundation: https://sleepfoundation.org  American Sleep Apnea Association: https://www.sleepapnea.org (for patients with sleep apnea)  Narcolepsy Network: https://www.narcolepsynetwork.org (for patients with narcolepsy)  [employer]: https://www.wakeupnarcolepsy.org (for patients with narcolepsy)  Hypersomnia Foundation: https://www.hypersomniafoundation.org (for patients with idiopathic hypersomnia)  RLS foundation: https://www.rls.org (for patients with restless leg syndrome)    IMPORTANT INFORMATION     Call 911 for medical emergencies.  Our offices are generally open from Monday-Friday, 8 am - 5 pm.    There are no supporting services by either the sleep doctors or their staff on weekends and Holidays, or after 5 PM on weekdays.   If you need to get in touch with me, you may either call my office number or you can use [employer].  If a referral for a test, for CPAP, or for another specialist was made, and you have not heard about scheduling this within a week, please call scheduling at [phone]).  If you are unable to make your appointment for clinic or an overnight study, kindly call the office or sleep testing center at least 48 hours in advance to cancel and reschedule.  If you are on CPAP, please bring your device's card and/or the device to each clinic appointment.   In case of problems with PAP machine or mask interface, please contact your [employer]urable Medical [employer]) company first. [employer] is the company who provides you the machine and/or PAP supplies.       PRESCRIPTIONS     We require 7 days advanced notice for prescription refills. If we do not receive the request in this time, we cannot guarantee that your medication will be refilled in time.    IMPORTANT PHONE NUMBERS     Sleep Medicine Clinic Fax: [phone]  Appointments (for Pediatric Sleep Clinic): [phone] - option 1  Appointments (for Adult Sleep Clinic): [phone] - option 2  Appointments (For Sleep Studies): [phone] - option 3  Behavioral Sleep Medicine: [phone]  Sleep Surgery: [phone]  Nutrition Service: [phone]  Weight management clinics with endocrinology: [phone]  Bariatric Services: [phone] (includes weight loss medications and weight loss surgery)  [facility]: [phone] (offers holistic approaches to weight management)  ENT (Otolaryngology): [phone]  Headache Clinic (Neurology): [phone]  Neurology: [phone]  Psychiatry: [phone]  Pulmonary Function Testing (PFT) Center: [phone]  Pulmonary Medicine: [phone]  Medical Service  "Company (DME): [phone]      OUR SLEEP TESTING LOCATIONS     Our team will contact you to schedule your sleep study, however, you can contact us as follow:  Main Phone Line (scheduling only): [phone]), option 3  Adult and Pediatric Locations  [facility] (6 years and older): [facility] - 4th floor ([address] [facility]) After hours line: [phone]  [facility] (Main campus: All ages): [facility] [place], 6th floor. After hours line: [phone]   [place] (5 years and older; younger considered on case-by-case basis): [address]; [address], [address]. Scheduling  After hours line: [phone]   [place] (6 years and older): [address]; [address]   [place] (6 years and older): [address] [facility], [address]  After hours line: [phone]   [place] (13 years and older) in [place]: [address], 2nd floor  After hours line: [phone]   [place] (13 year and older): [address]  After hours line: [phone]     Adult Only Locations:   [place] (18 years and older): [address] [facility], 2nd floor   [place] (18 years and older): [address] [facility]; 4th floor  After hours line: [phone]  [facility] (18 years and older) at [place]: [address] [facility]  After hours line: [phone]     CONTACTING YOUR SLEEP MEDICINE PROVIDER and SLEEP TEAM     For issues with your machine or mask interface, please call your DME provider first. DME stands for durable medical company. DME is the company who provides you the machine and/or PAP supplies / accessories.   To schedule, cancel, or reschedule SLEEP STUDY APPOINTMENTS, please call the Main Phone Line at [phone] - option 3.   To schedule, cancel, or reschedule CLINIC APPOINTMENTS, you can do it in \"MyChart\", call [phone] to speak with my  (Yesi Davis), or call the Main Phone " "Line at [phone] - option 2  For CLINICAL QUESTIONS or MEDICATION REFILLS, please call direct line for Adult Sleep Nurses at [phone].   Lastly, you can also send a message directly to your provider through \"My Chart\", which is the email service through your  Records Account: [email or web address]       Here at [facility], we wish you a restful sleep!    "

## 2023-10-09 NOTE — PROGRESS NOTES
[facility] SLEEP MEDICINE CLINIC  FOLLOW-UP VISIT NOTE    Clinic Location: [facility]  PCP: Eliana Knowles MD    HISTORY OF PRESENT ILLNESS     Patient ID: Sergey South is a 48 y.o. female who presents for follow-up after sleep study.     Patient is here alone today.  To review, patient's medical history is notable for morbid obesity (BMI 44), HTN, seasonal allergies, CNS demyelination, and suspected sleep apnea.     Interval History  Patient was last seen in 8/11/2023.   Since last visit, patient had completed sleep study which showed SEVERE BRIE and sleep related hypoxemia.  She denies symptoms of parasomnias, and shift-work disorder.     RLS Followup:   RLS symptoms once a week. It does not affect sleep onset nor wakes her up from sleep.    SLEEP STUDY HISTORY (personally reviewed raw data such as interpretation report, data sheet, hypnogram, and titration table if available and applicable)  HST 8/25/2023: AHI3% 69.4, SpO2 ivania 71.5%, spent 94.6 mins with SpO2<89%    SLEEP-WAKE SCHEDULE  Bedtime: 2 AM daily  Subjective sleep latency: 30-60 mins  Difficulty falling asleep: yes due to rumination  number of awakenings per night: wakes up every 2 hours to go to bathroom   Nocturia: yes  Falls back asleep right away  Difficulty staying asleep: yes due to nocturia  Final wakeup time: 8 AM daily  Get out of bed time: 8 AM daily  Shift work: no  Naps: once daily for 2 hours from 10 AM to 12 NN  Feels refreshed after a nap: no  Average sleep duration (excluding naps): 4-6 hours    SLEEP ENVIRONMENT  Sleep location: bed  Sleep status: sleeps alone during work nights and sleeps with bed partner during naps  Preferred sleep position: Side  TV in bedroom: yes  Room is dark:  Yes  Room is quiet: Yes  Room is cool: Yes  Bed comfort: good    SOCIAL HISTORY  Smoking: no  ETOH: no  Marijuana: no  Caffeine: 1 cup coffee daily  Sleep aids: yes on melatonin with Valerian root    WEIGHT: lost 14 lbs in  "6 months     Claustrophobia: Yes     Active Problems, Allergy List, Medication List, and PMH/PSH/FH/Social Hx have been reviewed and reconciled in chart. No significant changes unless documented in the pertinent chart section. Updates made when necessary.     PHYSICAL EXAM     VITAL SIGNS: There were no vitals taken for this visit.    NECK CIRCUMFERENCE: 17.75 inches    Today ESS: 8  Last visit ESS: 4  NELDA: 19    RESULTS/DATA     No results found for: \"IRON\", \"TRANSFERRIN\", \"IRONSAT\", \"TIBC\", \"FERRITIN\"    Bicarbonate   Date Value Ref Range Status   05/18/2023 25 21 - 32 mmol/L Final       ASSESSMENT/PLAN     Assessment/Plan   Problem List Items Addressed This Visit             ICD-10-CM    HTN (hypertension) I10     BP stable today  Continue meds per PCP  Defer management to PCP.         Obstructive sleep apnea - Primary G47.33     Discussed sleep study results with patient today. Due to severity of sleep apnea and persistent hypoxemia, recommend doing CPAP/BPAP titration study to start at CPAP 8 cm H2O, increased by 2 cm H2O increments, and switch to BPAP if there is persistent events at CPAP 16 cm H2O or CPAP intolerance is present.          Relevant Orders    In-Center Sleep Study (Sleep Provider Only)    Follow Up In Adult Sleep Medicine    Seasonal allergies J30.2     Consider trying fluticasone nasal spray 2 sprays per nostril once daily 1 hour before bedtime.  If there is inadequate or lack of improvement on the above intranasal steroid spray, consider adding Azelastine nasal spray, 2 sprays per nostril once daily in the morning.   Alternatively, may add cetirizine or loratadine 10 mg once daily especially if patient also has watery eyes.   Educated patient on proper use of intranasal sprays.            Morbid obesity with BMI of 40.0-44.9, adult (CMS/[facility]) E66.01, Z68.41     Advised weight loss with diet and exercise.  Weight loss can help in long term management of BRIE.  Defer management to PCP           " RLS (restless legs syndrome) G25.81     Vitamin D normal. No iron studies done.   Ordered ferritin and TSAT. Will replace if ferritin<75 or TSAT<17%  If above tests are normal, start Ropinirole.   Discussed that RLS is a clinical diagnosis. Discussed triggers of RLS such as caffeine, alcohol, nicotine, medications (antidepressants except Buproprion and Trazodone, 1st generation antihistamines, antipsychotics, anti-emetics except ondansetron), low iron  Supportive management: Avoid triggers of RLS. Taper off or reduce dose of medications that can cause RLS or switch them to Bupropion if feasible. May take warm bath 2 hours before bedtime. 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 She verbalizes understanding and agreement with my assessment and plan.    Follow-up 2-3 weeks after testing

## 2023-10-13 ENCOUNTER — OFFICE VISIT (OUTPATIENT)
Dept: SLEEP MEDICINE | Facility: CLINIC | Age: 48
End: 2023-10-13
Payer: COMMERCIAL

## 2023-10-13 VITALS
WEIGHT: 250 LBS | HEIGHT: 63 IN | RESPIRATION RATE: 18 BRPM | SYSTOLIC BLOOD PRESSURE: 138 MMHG | BODY MASS INDEX: 44.3 KG/M2 | DIASTOLIC BLOOD PRESSURE: 88 MMHG

## 2023-10-13 DIAGNOSIS — E66.01 MORBID OBESITY WITH BMI OF 40.0-44.9, ADULT (MULTI): ICD-10-CM

## 2023-10-13 DIAGNOSIS — G47.33 OBSTRUCTIVE SLEEP APNEA: Primary | ICD-10-CM

## 2023-10-13 DIAGNOSIS — J30.2 SEASONAL ALLERGIES: ICD-10-CM

## 2023-10-13 DIAGNOSIS — G25.81 RLS (RESTLESS LEGS SYNDROME): ICD-10-CM

## 2023-10-13 DIAGNOSIS — E83.10 DISORDER OF IRON METABOLISM: ICD-10-CM

## 2023-10-13 DIAGNOSIS — I10 PRIMARY HYPERTENSION: ICD-10-CM

## 2023-10-13 PROCEDURE — 4010F ACE/ARB THERAPY RXD/TAKEN: CPT | Performed by: INTERNAL MEDICINE

## 2023-10-13 PROCEDURE — 1036F TOBACCO NON-USER: CPT | Performed by: INTERNAL MEDICINE

## 2023-10-13 PROCEDURE — 99214 OFFICE O/P EST MOD 30 MIN: CPT | Performed by: INTERNAL MEDICINE

## 2023-10-13 PROCEDURE — 3046F HEMOGLOBIN A1C LEVEL >9.0%: CPT | Performed by: INTERNAL MEDICINE

## 2023-10-13 PROCEDURE — 3075F SYST BP GE 130 - 139MM HG: CPT | Performed by: INTERNAL MEDICINE

## 2023-10-13 PROCEDURE — 3079F DIAST BP 80-89 MM HG: CPT | Performed by: INTERNAL MEDICINE

## 2023-10-13 PROCEDURE — 3008F BODY MASS INDEX DOCD: CPT | Performed by: INTERNAL MEDICINE

## 2023-10-13 ASSESSMENT — SLEEP AND FATIGUE QUESTIONNAIRES
ESS TOTAL SCORE: 8
HOW LIKELY ARE YOU TO NOD OFF OR FALL ASLEEP WHILE SITTING QUIETLY AFTER LUNCH WITHOUT ALCOHOL: SLIGHT CHANCE OF DOZING
HOW LIKELY ARE YOU TO NOD OFF OR FALL ASLEEP WHILE LYING DOWN TO REST IN THE AFTERNOON WHEN CIRCUMSTANCES PERMIT: HIGH CHANCE OF DOZING
SITING INACTIVE IN A PUBLIC PLACE LIKE A CLASS ROOM OR A MOVIE THEATER: NO CHANCE OF DOZING
HOW LIKELY ARE YOU TO NOD OFF OR FALL ASLEEP IN A CAR, WHILE STOPPED FOR A FEW MINUTES IN TRAFFIC: NO CHANCE OF DOZING
HOW LIKELY ARE YOU TO NOD OFF OR FALL ASLEEP WHILE WATCHING TV: SLIGHT CHANCE OF DOZING
HOW LIKELY ARE YOU TO NOD OFF OR FALL ASLEEP WHILE SITTING AND TALKING TO SOMEONE: NO CHANCE OF DOZING
HOW LIKELY ARE YOU TO NOD OFF OR FALL ASLEEP WHEN YOU ARE A PASSENGER IN A CAR FOR AN HOUR WITHOUT A BREAK: NO CHANCE OF DOZING
HOW LIKELY ARE YOU TO NOD OFF OR FALL ASLEEP WHILE SITTING AND READING: HIGH CHANCE OF DOZING

## 2023-10-13 ASSESSMENT — PAIN SCALES - GENERAL: PAINLEVEL: 5

## 2023-10-13 NOTE — ASSESSMENT & PLAN NOTE
Advised weight loss with diet and exercise.  Weight loss can help in long term management of BRIE.  Defer management to PCP

## 2023-10-13 NOTE — ASSESSMENT & PLAN NOTE
Vitamin D normal. No iron studies done.   Ordered ferritin and TSAT. Will replace if ferritin<75 or TSAT<17%  If above tests are normal, start Ropinirole.   Discussed that RLS is a clinical diagnosis. Discussed triggers of RLS such as caffeine, alcohol, nicotine, medications (antidepressants except Buproprion and Trazodone, 1st generation antihistamines, antipsychotics, anti-emetics except ondansetron), low iron  Supportive management: Avoid triggers of RLS. Taper off or reduce dose of medications that can cause RLS or switch them to Bupropion if feasible. May take warm bath 2 hours before bedtime. Massage and/or stretch legs while in bed

## 2023-10-13 NOTE — ASSESSMENT & PLAN NOTE
Discussed sleep study results with patient today. Due to severity of sleep apnea and persistent hypoxemia, recommend doing CPAP/BPAP titration study to start at CPAP 8 cm H2O, increased by 2 cm H2O increments, and switch to BPAP if there is persistent events at CPAP 16 cm H2O or CPAP intolerance is present.

## 2023-10-13 NOTE — ASSESSMENT & PLAN NOTE
Consider trying fluticasone nasal spray 2 sprays per nostril once daily 1 hour before bedtime.  If there is inadequate or lack of improvement on the above intranasal steroid spray, consider adding Azelastine nasal spray, 2 sprays per nostril once daily in the morning.   Alternatively, may add cetirizine or loratadine 10 mg once daily especially if patient also has watery eyes.   Educated patient on proper use of intranasal sprays.

## 2023-10-16 ENCOUNTER — OFFICE VISIT (OUTPATIENT)
Dept: PRIMARY CARE | Facility: CLINIC | Age: 48
End: 2023-10-16
Payer: COMMERCIAL

## 2023-10-16 ENCOUNTER — LAB (OUTPATIENT)
Dept: LAB | Facility: LAB | Age: 48
End: 2023-10-16
Payer: COMMERCIAL

## 2023-10-16 VITALS
OXYGEN SATURATION: 96 % | HEART RATE: 86 BPM | SYSTOLIC BLOOD PRESSURE: 124 MMHG | TEMPERATURE: 97.1 F | BODY MASS INDEX: 44.57 KG/M2 | DIASTOLIC BLOOD PRESSURE: 90 MMHG | WEIGHT: 251.6 LBS

## 2023-10-16 DIAGNOSIS — E66.01 MORBID OBESITY WITH BMI OF 40.0-44.9, ADULT (MULTI): ICD-10-CM

## 2023-10-16 DIAGNOSIS — B00.9 HERPES SIMPLEX TYPE 1 INFECTION: Primary | ICD-10-CM

## 2023-10-16 DIAGNOSIS — G45.9 TIA (TRANSIENT ISCHEMIC ATTACK): ICD-10-CM

## 2023-10-16 DIAGNOSIS — G47.33 OBSTRUCTIVE SLEEP APNEA: ICD-10-CM

## 2023-10-16 DIAGNOSIS — E78.00 HYPERCHOLESTEREMIA: ICD-10-CM

## 2023-10-16 DIAGNOSIS — F32.A DEPRESSION, UNSPECIFIED DEPRESSION TYPE: ICD-10-CM

## 2023-10-16 DIAGNOSIS — G47.30 SLEEP APNEA, UNSPECIFIED TYPE: ICD-10-CM

## 2023-10-16 DIAGNOSIS — E11.65 UNCONTROLLED TYPE 2 DIABETES MELLITUS WITH HYPERGLYCEMIA (MULTI): ICD-10-CM

## 2023-10-16 DIAGNOSIS — E83.10 DISORDER OF IRON METABOLISM: ICD-10-CM

## 2023-10-16 DIAGNOSIS — R76.8 POSITIVE ANA (ANTINUCLEAR ANTIBODY): ICD-10-CM

## 2023-10-16 LAB
FERRITIN SERPL-MCNC: 59 NG/ML (ref 8–150)
IRON SATN MFR SERPL: 20 % (ref 25–45)
IRON SERPL-MCNC: 64 UG/DL (ref 35–150)
TIBC SERPL-MCNC: 328 UG/DL (ref 240–445)
UIBC SERPL-MCNC: 264 UG/DL (ref 110–370)

## 2023-10-16 PROCEDURE — 3074F SYST BP LT 130 MM HG: CPT | Performed by: INTERNAL MEDICINE

## 2023-10-16 PROCEDURE — 3046F HEMOGLOBIN A1C LEVEL >9.0%: CPT | Performed by: INTERNAL MEDICINE

## 2023-10-16 PROCEDURE — 3008F BODY MASS INDEX DOCD: CPT | Performed by: INTERNAL MEDICINE

## 2023-10-16 PROCEDURE — 99214 OFFICE O/P EST MOD 30 MIN: CPT | Performed by: INTERNAL MEDICINE

## 2023-10-16 PROCEDURE — 3080F DIAST BP >= 90 MM HG: CPT | Performed by: INTERNAL MEDICINE

## 2023-10-16 PROCEDURE — 4010F ACE/ARB THERAPY RXD/TAKEN: CPT | Performed by: INTERNAL MEDICINE

## 2023-10-16 PROCEDURE — 36415 COLL VENOUS BLD VENIPUNCTURE: CPT

## 2023-10-16 PROCEDURE — 1036F TOBACCO NON-USER: CPT | Performed by: INTERNAL MEDICINE

## 2023-10-16 RX ORDER — ACYCLOVIR 400 MG/1
400 TABLET ORAL 3 TIMES DAILY
Qty: 30 TABLET | Refills: 0 | Status: SHIPPED | OUTPATIENT
Start: 2023-10-16 | End: 2023-10-26

## 2023-10-16 ASSESSMENT — ENCOUNTER SYMPTOMS
PALPITATIONS: 0
ABDOMINAL PAIN: 0
DIZZINESS: 0
SINUS PAIN: 0
DIFFICULTY URINATING: 0
BRUISES/BLEEDS EASILY: 0
HEADACHES: 0
FATIGUE: 0
SORE THROAT: 0
WHEEZING: 0
BLOOD IN STOOL: 0
ARTHRALGIAS: 0
UNEXPECTED WEIGHT CHANGE: 0
COUGH: 0
FEVER: 0
DIARRHEA: 0

## 2023-10-16 NOTE — PROGRESS NOTES
Subjective   Patient ID: Sergey South is a 48 y.o. female who presents for 1 month management, Results (BW), and Herpes Zoster (Discuss what can be done, breaks out in fall and winter).    -Patient seen by sleep clinic Dr. israel diagnosis of sleep apnea awaiting titration study and starting CPAP  Iron studies obtained results are still pending.  Underlying restless leg syndrome  -Patient seen by neurology Dr. Santa  awaiting follow-up results and further recommendation to exclude underlying MS  -Recurrent herpes simplex type I on the face usually in the wintertime patient now having start of blisters on the nasal bridge  Patient to start on acyclovir 400 mg 3 times daily for 10 days follow-up results closely as needed  - Positive JANAE review current mouth ulcers may have underlying systemic lupus patient with recent stroke  Patient referred to rheumatology for further recommendation Dr Vail  -Depression on Pristiq 50 mg needs to continue new medication side effect  Follow-up 3 months  - TIA, no recurrence now patient off Plavix after 21 days continue with aspirin daily as recommended no recurrent TIAs  -Diabetes continue with metformin 1 g twice a day continue low-carb diet controlled blood sugar record from home reviewed range 100 hemoglobin A1c 6 compensated counseled about weight loss  - Hypercholesterolemia continues atorvastatin  - Hypertension controlled  ---Need to proceed with mammogram arrange with screening colonoscopy when patient stable next appointment  Follow-up 3 months      Herpes Zoster  Associated symptoms include a rash. Pertinent negatives include no abdominal pain, arthralgias, chest pain, congestion, coughing, fatigue, fever, headaches or sore throat.          Review of Systems   Constitutional:  Negative for fatigue, fever and unexpected weight change.   HENT:  Negative for congestion, ear discharge, ear pain, mouth sores, sinus pain and sore throat.    Eyes:  Negative for visual  disturbance.   Respiratory:  Negative for cough and wheezing.    Cardiovascular:  Negative for chest pain, palpitations and leg swelling.   Gastrointestinal:  Negative for abdominal pain, blood in stool and diarrhea.   Genitourinary:  Negative for difficulty urinating.   Musculoskeletal:  Negative for arthralgias.   Skin:  Positive for rash.   Neurological:  Negative for dizziness and headaches.   Hematological:  Does not bruise/bleed easily.   Psychiatric/Behavioral:  Negative for behavioral problems.    All other systems reviewed and are negative.      Objective   Lab Results   Component Value Date    HGBA1C 6.0 09/13/2023      /90   Pulse 86   Temp 36.2 °C (97.1 °F)   Wt 114 kg (251 lb 9.6 oz)   SpO2 96%   BMI 44.57 kg/m²     Physical Exam  Vitals and nursing note reviewed.   Constitutional:       Appearance: Normal appearance.   HENT:      Head: Normocephalic.      Nose: Nose normal.   Eyes:      Conjunctiva/sclera: Conjunctivae normal.      Pupils: Pupils are equal, round, and reactive to light.   Cardiovascular:      Rate and Rhythm: Regular rhythm.   Pulmonary:      Effort: Pulmonary effort is normal.      Breath sounds: Normal breath sounds.   Abdominal:      General: Abdomen is flat.      Palpations: Abdomen is soft.   Musculoskeletal:      Cervical back: Neck supple.   Skin:     General: Skin is warm.      Findings: Lesion (Small vesicular rash on the left side of the nasal bridge) present.   Neurological:      General: No focal deficit present.      Mental Status: She is oriented to person, place, and time.   Psychiatric:         Mood and Affect: Mood normal.       Assessment/Plan   Sergey was seen today for 1 month management, results and herpes zoster.  Diagnoses and all orders for this visit:  Herpes simplex type 1 infection (Primary)  -     acyclovir (Zovirax) 400 mg tablet; Take 1 tablet (400 mg) by mouth 3 times a day for 10 days.  Positive JANAE (antinuclear antibody)  Sleep apnea,  unspecified type  TIA (transient ischemic attack)  Hypercholesteremia  Depression, unspecified depression type  Morbid obesity with BMI of 40.0-44.9, adult (CMS/[facility])  Uncontrolled type 2 diabetes mellitus with hyperglycemia (CMS/[facility])  Obstructive sleep apnea  Other orders  -     Follow Up In Primary Care - Established  -     Follow Up In Primary Care - Established; Future   -Patient seen by sleep clinic Dr. israel diagnosis of sleep apnea awaiting titration study and starting CPAP  Iron studies obtained results are still pending.  Underlying restless leg syndrome  -Patient seen by neurology Dr. Santa  awaiting follow-up results and further recommendation to exclude underlying MS  -Recurrent herpes simplex type I on the face usually in the wintertime patient now having start of blisters on the nasal bridge  Patient to start on acyclovir 400 mg 3 times daily for 10 days follow-up results closely as needed  - Positive JANAE review current mouth ulcers may have underlying systemic lupus patient with recent stroke  Patient referred to rheumatology for further recommendation Dr Vail  -Depression on Pristiq 50 mg needs to continue new medication side effect  Follow-up 3 months  - TIA, no recurrence now patient off Plavix after 21 days continue with aspirin daily as recommended no recurrent TIAs  -Diabetes continue with metformin 1 g twice a day continue low-carb diet controlled blood sugar record from home reviewed range 100 hemoglobin A1c 6 compensated counseled about weight loss  - Hypercholesterolemia continues atorvastatin  - Hypertension controlled  ---Need to proceed with mammogram arrange with screening colonoscopy when patient stable next appointment  Follow-up 3 months

## 2023-10-18 DIAGNOSIS — G25.81 RLS (RESTLESS LEGS SYNDROME): ICD-10-CM

## 2023-10-18 DIAGNOSIS — G47.00 INSOMNIA, UNSPECIFIED TYPE: ICD-10-CM

## 2023-10-18 DIAGNOSIS — E83.10 DISORDER OF IRON METABOLISM: Primary | ICD-10-CM

## 2023-10-18 RX ORDER — FERROUS SULFATE 325(65) MG
65 TABLET ORAL
Qty: 180 TABLET | Refills: 0 | Status: SHIPPED | OUTPATIENT
Start: 2023-10-18 | End: 2024-01-16

## 2023-10-19 ENCOUNTER — TELEPHONE (OUTPATIENT)
Dept: SLEEP MEDICINE | Facility: HOSPITAL | Age: 48
End: 2023-10-19
Payer: COMMERCIAL

## 2023-10-19 NOTE — TELEPHONE ENCOUNTER
----- Message from Mere Arroyo MD sent at 10/18/2023  2:50 PM EDT -----  Iron low, start iron pills for 3 months. Rx sent to pharmacy. Repeat blood tests after 3 months.

## 2023-10-20 RX ORDER — ZOLPIDEM TARTRATE 5 MG/1
5 TABLET ORAL ONCE
Qty: 1 TABLET | Refills: 0 | Status: SHIPPED | OUTPATIENT
Start: 2023-10-20 | End: 2024-02-19 | Stop reason: ALTCHOICE

## 2023-10-23 ENCOUNTER — OFFICE VISIT (OUTPATIENT)
Dept: NEUROLOGY | Facility: HOSPITAL | Age: 48
End: 2023-10-23
Payer: COMMERCIAL

## 2023-10-23 VITALS
BODY MASS INDEX: 43.77 KG/M2 | HEIGHT: 63 IN | RESPIRATION RATE: 18 BRPM | HEART RATE: 97 BPM | SYSTOLIC BLOOD PRESSURE: 151 MMHG | WEIGHT: 247 LBS | DIASTOLIC BLOOD PRESSURE: 86 MMHG

## 2023-10-23 DIAGNOSIS — G37.9 CNS DEMYELINATION (MULTI): Primary | ICD-10-CM

## 2023-10-23 PROCEDURE — 99213 OFFICE O/P EST LOW 20 MIN: CPT | Performed by: PSYCHIATRY & NEUROLOGY

## 2023-10-23 PROCEDURE — 3046F HEMOGLOBIN A1C LEVEL >9.0%: CPT | Performed by: PSYCHIATRY & NEUROLOGY

## 2023-10-23 PROCEDURE — 4010F ACE/ARB THERAPY RXD/TAKEN: CPT | Performed by: PSYCHIATRY & NEUROLOGY

## 2023-10-23 PROCEDURE — 3077F SYST BP >= 140 MM HG: CPT | Performed by: PSYCHIATRY & NEUROLOGY

## 2023-10-23 PROCEDURE — 3079F DIAST BP 80-89 MM HG: CPT | Performed by: PSYCHIATRY & NEUROLOGY

## 2023-10-23 PROCEDURE — 1036F TOBACCO NON-USER: CPT | Performed by: PSYCHIATRY & NEUROLOGY

## 2023-10-23 PROCEDURE — 3008F BODY MASS INDEX DOCD: CPT | Performed by: PSYCHIATRY & NEUROLOGY

## 2023-10-23 ASSESSMENT — PAIN SCALES - GENERAL: PAINLEVEL: 4

## 2023-10-23 NOTE — PATIENT INSTRUCTIONS
Your MRIs from June did not show evidence of active inflammation or lesions in the spinal cord, which is great. The diagnosis of MS is not confirmed but having those lesions puts at higher risk for developing MS in the future and you should be monitored for MS with yearly brain MRI for 3 to 5 years.     I will order your next brain MRI to be done in June of 2024.  Please call radiology at [phone] to schedule your test.       Please continue to take high dose of vitamin D to reduce your future MS risk.     Follow up in June after MRI or sooner if you have any new neurological symptoms.    Thank you visiting the clinic today    Cachorro Santa MD

## 2023-10-24 NOTE — PROGRESS NOTES
Provider Impressions     Neuro - Immunology Assessment: This is a 47 year old white,~right - handed female, with PMH of: obesity, recurrent cold sores, shingles, and recently diagnosed HTN and DMT who who presents: with a transient (hours) left hemifacial numbness in 4/2023.   The Neurological Exam showed: distal reduction of vibration sense, glove/stocking hyposthesia, and diffuse symmetric hyperreflexia.   MRI Showed: a moderate burden of typical demyelinating lesions in periventricular and juxtacortical locations without any infratentorial lesions (there is only left cerebellar artifact on FLAIR that is not present on T2). There is one GRE positive dot in the left hemisphere. I personally reviewed her non contrasted brain MRI from 4/2023.   CSF (Cerebrospinal Fluid): not done.   OCT/VEP: not done.   MS Mimics: not ruled out.   The Overall Picture is Suggestive of: RIS versus early RRMS. it is unclear if he transient left hemifacial numbness was an MS relapse given its short duration (hours). DD of the event includes small vessel TIA, diabetic mononeuropathy, or ephaptic phenomenon from demyelination.   DMT (Disease Modifying Therapies): Unclear if indicated. will decide after workup.     10/24/2023: No more episodes of facial numbness. Complains only of ongoing neuropathy symptoms and easy tripping. Brain MRI WWO in June of 2023 did not show any abnormal enhancement but one periventricular lesion on the right side looked more prominent on my review compared to the April study. MRI cervical did not show any lesions. Will continue to monitor off DMT with the next MRI in June of 2024.              Plan: demyelination was discussed with the patient (and family if present) in detail including pathogenesis, clinical picture, complications, course, prognosis, monitoring strategy, and management options. All questions answered.   Acute Relapse Management: not indicated.   DMT (Disease Modifying Therapies): unclear if  indicated. Monitoring off DMT for now.   Will Order MRI of: the brain WWO to be done in June of 2024.   To Be Done: may need OCT and/or LP if diagnosis remains unclear after imaging.   Symptomatic Management: continue ASA 81 mg.   Smoking: non smoker.   PT/OT: not indicated.   Instructions: Keep an active lifestyle and develop exercise routine as tolerated. Recommend a balanced healthy diet. Avoid excessive amounts of salt, carbohydrates, fat, and red meat. Increase intake of fruits, vegetables, and white meat.   Follow-Up: after MRI in June of 2024        Chief Complaint  demyelination.      History of Present Illness     Neuro - Immunology   Date of onset: 4/2023 vs incidental   Date of diagnosis: diagnosis not finalized yet   Last MRI brain: 6/2023   Last MRI cervical: 6/2023    Last MRI thoracic: not done    Last OCT (Optical Coherence Tomography/Visual Evoked Potential): not done ~   RRMS VS RIS.      Disease Course at Onset: monophasic vs incidental.   Disease Course Now: monophasic vs incidental.   Current DMT (Disease Modifying Therapies): none.   Previous DMT (Disease Modifying Therapies): none.   CSF (Cerebrospinal Fluid): not done.   JCV (Jak Cunningham Virus): not done.   VZV (Varicella Zoster Virus): not done.   Hep Panel: not done.   NMO (Neuromyelitis Optica): not done.   MOG (Myelin Oligodendrocyte Glycoprotein): not done.     Narrative HPI:   This is a 46 yo right handed female patient with hx of obesity, recurrent cold sores, shingles, HTN, and DM who is here for RIS follow up.     Interval hx:  No more episodes of facial numbness. Complains only of ongoing neuropathy symptoms and easy tripping. Brain MRI WWO in June of 2023 did not show any abnormal enhancement but one periventricular lesion on the right side looked more prominent on my review compared to the April study. MRI cervical did not show any lesions.     MS Symptom Review:   No weakness.   Sensory changes:~presenting symptom.   No  incoordination.   No Falling.   No gait change.   No painful vision loss.   No double vision.   No vertigo.~occasional lightheadedness.   No facial/bulbar weakness.   No Lhermitte's.   Bladder/bowel dysfunction:~urinary frquency and urgency that improved after starting DM treatment.   No spasticity.   No tonic spasms.   No tremors.   No RLS (Restless Leg Syndrome).   No Dystonia.   No other movement disorder.   No heat sensitivity.   Fatigue:   Depression:   Anxiety:   Cognitive changes:~short term.   No DMT.   Vitamin D:~taking only multivitamins.      Review of Systems     Constitutional: no fever~and~no unexplained weight loss.   Eyes: no diplopia~and~no vision loss/change.   ENMT: dizziness/vertigo, but~no hearing loss.   Respiratory: no cough~and~no dyspnea.   Cardiovascular: no chest pain~and~no palpitations.   Gastrointestinal: no dysphagia~and~no bleeding.   Genitourinary: no hematuria~and~as noted in HPI.   Musculoskeletal: no nonspecific pain, swelling, and stiffness~and~no muscle weakness.   Skin: no rashes.   Neurological: no seizures,~no frequent falls~and~as noted in HPI.   Psychiatric: depression~and~anxiety.   Hematologic/Lymphatic: no excessive bruising.   All other systems have been reviewed and are negative for complaint.      Active Problems  Problems    · Body mass index (BMI) of 40.0 to 44.9 in adult (V85.41) (Z68.41)   · Facial numbness (782.0) (R20.0)   · Morbid obesity (278.01) (E66.01)   · Poorly controlled type 2 diabetes mellitus (250.00) (E11.65)   · TIA (transient ischemic attack) (435.9) (G45.9)     Family History  Mother    · Family history of factor V Leiden mutation (V18.3) (Z83.2)     Social History  Problems    · Non-smoker (V49.89) (Z78.9)   · Social alcohol use (V49.89) (Z78.9)     Allergies  Medication    · No Known Drug Allergies   Recorded By: Dilan Arevalo; 4/25/2023 12:57:10 PM          Physical Exam  Constitutional: General appearance: no acute distress   Mental  status: The patient was in no distress, alert, interactive and cooperative. Affect is appropriate.   Orientation: oriented to person,~oriented to place~and~oriented to time.   Memory: recent memory intact~and~remote memory intact.   Attention: normal attention span~and~normal concentrating ability.   Language: normal comprehension~and~no difficulty naming common objects.   Fund of knowledge: Patient displays adequate knowledge of current events,~adequate fund of knowledge regarding past history~and~adequate fund of knowledge regarding vocabulary.   Cranial nerve II: Visual fields full to confrontation.~no red desaturation or RAPD.   Cranial nerves III, IV, and VI: Pupils round, equally reactive to light; no ptosis. EOMs intact. No nystagmus.~no SYDNEY.   Cranial Nerve V: Facial sensation intact bilaterally.   Cranial nerve VII: Normal and symmetric facial strength.   Cranial nerve VIII: Hearing is intact bilaterally to finger rub / whisper.   Cranial nerves IX and X: Palate elevates symmetrically.   Cranial nerve XI: Shoulder shrug and neck rotation strength are intact.   Cranial nerve XII: Tongue midline with normal strength.   Motor: Muscle bulk was normal in both upper and lower extremities.~Muscle tone was normal in both upper and lower extremities.~Muscle strength was 5/5 throughout.~no abnormal or adventitious movements were present.   Deep Tendon Reflexes: left biceps 3+,~right biceps 3+,~left triceps 2+,~right triceps 2+,~left brachioradialis 3+,~right brachioradialis 3+,~left patella 3+,~right patella 3+,~left ankle jerk 3+,~right ankle jerk 3+   Plantar Reflex: Toes downgoing to plantar stimulation on the right.   Sensory Exam:. glove and stocking hyposthesia and distal reduction of vibration sense.   Coordination: There is no limb dystaxia and rapid alternating movements are intact.   Gait: Gait is normal without spasticity, ataxia or bradykinesia. Stance is stable with a negative Romberg.

## 2023-11-08 DIAGNOSIS — E55.9 VITAMIN D DEFICIENCY: Primary | ICD-10-CM

## 2023-11-08 RX ORDER — ERGOCALCIFEROL 1.25 MG/1
1 CAPSULE ORAL
Qty: 4 CAPSULE | Refills: 3 | Status: SHIPPED | OUTPATIENT
Start: 2023-11-08 | End: 2024-03-05

## 2023-11-16 ENCOUNTER — LAB (OUTPATIENT)
Dept: LAB | Facility: LAB | Age: 48
End: 2023-11-16
Payer: COMMERCIAL

## 2023-11-16 ENCOUNTER — OFFICE VISIT (OUTPATIENT)
Dept: RHEUMATOLOGY | Facility: CLINIC | Age: 48
End: 2023-11-16
Payer: COMMERCIAL

## 2023-11-16 VITALS
HEIGHT: 63 IN | DIASTOLIC BLOOD PRESSURE: 85 MMHG | WEIGHT: 247 LBS | BODY MASS INDEX: 43.77 KG/M2 | SYSTOLIC BLOOD PRESSURE: 145 MMHG

## 2023-11-16 DIAGNOSIS — R76.8 ANA POSITIVE: Primary | ICD-10-CM

## 2023-11-16 DIAGNOSIS — R76.8 ANA POSITIVE: ICD-10-CM

## 2023-11-16 DIAGNOSIS — M32.9 LUPUS (MULTI): ICD-10-CM

## 2023-11-16 LAB
B2 GLYCOPROT1 IGA SER-ACNC: <0.6 U/ML
B2 GLYCOPROT1 IGG SER-ACNC: <1.4 U/ML
B2 GLYCOPROT1 IGM SER-ACNC: 0.5 U/ML
C3 SERPL-MCNC: 168 MG/DL (ref 87–200)
C4 SERPL-MCNC: 61 MG/DL (ref 10–50)
CARDIOLIPIN IGA SERPL-ACNC: <0.5 APL U/ML
CARDIOLIPIN IGG SER IA-ACNC: <1.6 GPL U/ML
CARDIOLIPIN IGM SER IA-ACNC: 0.3 MPL U/ML
CENTROMERE B AB SER-ACNC: <0.2 AI
CHROMATIN AB SERPL-ACNC: <0.2 AI
CREAT UR-MCNC: 92.5 MG/DL (ref 20–320)
CRP SERPL-MCNC: 0.45 MG/DL
DSDNA AB SER-ACNC: 1 IU/ML
ENA JO1 AB SER QL IA: <0.2 AI
ENA RNP AB SER IA-ACNC: <0.2 AI
ENA SCL70 AB SER QL IA: <0.2 AI
ENA SM AB SER IA-ACNC: <0.2 AI
ENA SM+RNP AB SER QL IA: <0.2 AI
ENA SS-A AB SER IA-ACNC: <0.2 AI
ENA SS-B AB SER IA-ACNC: <0.2 AI
ERYTHROCYTE [SEDIMENTATION RATE] IN BLOOD BY WESTERGREN METHOD: 68 MM/H (ref 0–20)
IGA SERPL-MCNC: 409 MG/DL (ref 70–400)
IGG SERPL-MCNC: 946 MG/DL (ref 700–1600)
IGM SERPL-MCNC: 32 MG/DL (ref 40–230)
PROT UR-ACNC: 12 MG/DL (ref 5–24)
PROT/CREAT UR: 0.13 MG/MG CREAT (ref 0–0.17)
RIBOSOMAL P AB SER-ACNC: <0.2 AI
THYROPEROXIDASE AB SERPL-ACNC: <28 IU/ML

## 2023-11-16 PROCEDURE — 86225 DNA ANTIBODY NATIVE: CPT

## 2023-11-16 PROCEDURE — 3077F SYST BP >= 140 MM HG: CPT | Performed by: INTERNAL MEDICINE

## 2023-11-16 PROCEDURE — 86376 MICROSOMAL ANTIBODY EACH: CPT

## 2023-11-16 PROCEDURE — 86038 ANTINUCLEAR ANTIBODIES: CPT

## 2023-11-16 PROCEDURE — 36415 COLL VENOUS BLD VENIPUNCTURE: CPT

## 2023-11-16 PROCEDURE — 86235 NUCLEAR ANTIGEN ANTIBODY: CPT

## 2023-11-16 PROCEDURE — 3061F NEG MICROALBUMINURIA REV: CPT | Performed by: INTERNAL MEDICINE

## 2023-11-16 PROCEDURE — 86147 CARDIOLIPIN ANTIBODY EA IG: CPT

## 2023-11-16 PROCEDURE — 99203 OFFICE O/P NEW LOW 30 MIN: CPT | Performed by: INTERNAL MEDICINE

## 2023-11-16 PROCEDURE — 3008F BODY MASS INDEX DOCD: CPT | Performed by: INTERNAL MEDICINE

## 2023-11-16 PROCEDURE — 3046F HEMOGLOBIN A1C LEVEL >9.0%: CPT | Performed by: INTERNAL MEDICINE

## 2023-11-16 PROCEDURE — 83516 IMMUNOASSAY NONANTIBODY: CPT

## 2023-11-16 PROCEDURE — 3079F DIAST BP 80-89 MM HG: CPT | Performed by: INTERNAL MEDICINE

## 2023-11-16 PROCEDURE — 86036 ANCA SCREEN EACH ANTIBODY: CPT

## 2023-11-16 PROCEDURE — 1036F TOBACCO NON-USER: CPT | Performed by: INTERNAL MEDICINE

## 2023-11-16 PROCEDURE — 86160 COMPLEMENT ANTIGEN: CPT

## 2023-11-16 PROCEDURE — 86140 C-REACTIVE PROTEIN: CPT

## 2023-11-16 PROCEDURE — 85652 RBC SED RATE AUTOMATED: CPT

## 2023-11-16 PROCEDURE — 84156 ASSAY OF PROTEIN URINE: CPT

## 2023-11-16 PROCEDURE — 82570 ASSAY OF URINE CREATININE: CPT

## 2023-11-16 PROCEDURE — 4010F ACE/ARB THERAPY RXD/TAKEN: CPT | Performed by: INTERNAL MEDICINE

## 2023-11-16 PROCEDURE — 81381 HLA I TYPING 1 ALLELE HR: CPT

## 2023-11-16 PROCEDURE — 86146 BETA-2 GLYCOPROTEIN ANTIBODY: CPT

## 2023-11-16 PROCEDURE — 82784 ASSAY IGA/IGD/IGG/IGM EACH: CPT

## 2023-11-16 ASSESSMENT — ENCOUNTER SYMPTOMS
FATIGUE: 1
SHORTNESS OF BREATH: 1
HEADACHES: 0
NUMBNESS: 1
APNEA: 1
ENDOCRINE COMMENTS: NOCTURIA
DIZZINESS: 0

## 2023-11-16 NOTE — PROGRESS NOTES
Subjective   Patient ID: Sergey South is a 48 y.o. female who presents for Lupus (New Patient Referred by Dr. Eliana Knowles for Lupus/Joint pain, muscle weakness, joint swelling, joint stiffness pain is worse in morning gets better as day goes on however at night pain returns and patient is also fatigue as well. ).  Lupus  Associated symptoms include fatigue and numbness. Pertinent negatives include no headaches or rash.     Patient referred for positive JANAE.    Patient noted to have achy and painful joints and occasional numbness in her fingers.  She always gets sinus and ear infections.  Currently she is on antibiotic because she had a cavity and tooth broke.  She did have zoster last.  Has diagnosis of sleep apnea, diabetes, Hyperlipidemia, depression,.  She did have episode in the spring of facial numbness and was diagnosed with diabetes and hypertension.  She gets occasional tingling in her fourth and fifth digit.        Review of Systems   Constitutional:  Positive for fatigue.   HENT:          Canker sores, cold sores   Eyes:         Wears glasses, increase is eye watering   Respiratory:  Positive for apnea and shortness of breath.    Endocrine:        Nocturia   Skin:  Negative for rash.   Neurological:  Positive for numbness. Negative for dizziness and headaches.        Left facial numbness has resolved.         Objective   Physical Exam  GEN: NAD A&O x3 appropriate affect  HENT: no enlarged glands or thyroid  EYES:  no conjunctival redness, eyelids normal  LYMPH: no cervical ymphadenopathy  SKIN: no rashes, psoriasis or nail pitting  PULSES: +radials  TENDER POINTS: 0/18   MSK:  Mild hypertrophic changes seen in the DIP and PIP no synovitis kyphoscoliosis of thoracic lumbar spine  Assessment/Plan     Patient with positive JANAE of uncertain clinical significance.  She had hospitalization earlier this year with facial numbness.  Is following up with neurology.  Will do further blood work concerning her  positive JANAE.  She does have symptoms consistent with degenerative disease in her DIPs and also chronic lower back pain.

## 2023-11-17 LAB — ANA SER QL HEP2 SUBST: NEGATIVE

## 2023-11-19 LAB
ANCA AB PATTERN SER IF-IMP: NORMAL
ANCA IGG TITR SER IF: NORMAL {TITER}
MYELOPEROXIDASE AB SER-ACNC: 0 AU/ML (ref 0–19)
PROTEINASE3 AB SER-ACNC: 0 AU/ML (ref 0–19)

## 2023-11-20 ENCOUNTER — TELEPHONE (OUTPATIENT)
Dept: RHEUMATOLOGY | Facility: CLINIC | Age: 48
End: 2023-11-20
Payer: COMMERCIAL

## 2023-11-20 LAB — HLAB27 TYPING: POSITIVE

## 2023-12-08 ENCOUNTER — APPOINTMENT (OUTPATIENT)
Dept: SLEEP MEDICINE | Facility: CLINIC | Age: 48
End: 2023-12-08
Payer: COMMERCIAL

## 2023-12-22 DIAGNOSIS — E11.65 POORLY CONTROLLED TYPE 2 DIABETES MELLITUS (MULTI): ICD-10-CM

## 2023-12-22 RX ORDER — INSULIN LISPRO 100 [IU]/ML
INJECTION, SUSPENSION SUBCUTANEOUS
Qty: 15 ML | Refills: 2 | Status: SHIPPED | OUTPATIENT
Start: 2023-12-22 | End: 2024-04-15

## 2024-01-03 DIAGNOSIS — R76.8 ANA POSITIVE: Primary | ICD-10-CM

## 2024-01-07 ENCOUNTER — CLINICAL SUPPORT (OUTPATIENT)
Dept: SLEEP MEDICINE | Facility: CLINIC | Age: 49
End: 2024-01-07
Payer: COMMERCIAL

## 2024-01-07 DIAGNOSIS — G47.33 OBSTRUCTIVE SLEEP APNEA: ICD-10-CM

## 2024-01-07 PROCEDURE — 95811 POLYSOM 6/>YRS CPAP 4/> PARM: CPT | Performed by: INTERNAL MEDICINE

## 2024-01-07 ASSESSMENT — PAIN SCALES - GENERAL: PAINLEVEL: 0-NO PAIN

## 2024-01-07 ASSESSMENT — SLEEP AND FATIGUE QUESTIONNAIRES: ESS TOTAL SCORE: 7

## 2024-01-08 VITALS
HEART RATE: 92 BPM | SYSTOLIC BLOOD PRESSURE: 136 MMHG | OXYGEN SATURATION: 98 % | DIASTOLIC BLOOD PRESSURE: 91 MMHG | HEIGHT: 63 IN | WEIGHT: 250 LBS | BODY MASS INDEX: 44.3 KG/M2 | RESPIRATION RATE: 20 BRPM

## 2024-01-08 ASSESSMENT — SLEEP AND FATIGUE QUESTIONNAIRES
HOW LIKELY ARE YOU TO NOD OFF OR FALL ASLEEP IN A CAR, WHILE STOPPED FOR A FEW MINUTES IN TRAFFIC: WOULD NEVER DOZE
HOW LIKELY ARE YOU TO NOD OFF OR FALL ASLEEP WHILE WATCHING TV: SLIGHT CHANCE OF DOZING
HOW LIKELY ARE YOU TO NOD OFF OR FALL ASLEEP WHILE SITTING AND READING: MODERATE CHANCE OF DOZING
HOW LIKELY ARE YOU TO NOD OFF OR FALL ASLEEP WHILE SITTING QUIETLY AFTER LUNCH WITHOUT ALCOHOL: MODERATE CHANCE OF DOZING
HOW LIKELY ARE YOU TO NOD OFF OR FALL ASLEEP WHILE SITTING AND TALKING TO SOMEONE: WOULD NEVER DOZE
HOW LIKELY ARE YOU TO NOD OFF OR FALL ASLEEP WHILE LYING DOWN TO REST IN THE AFTERNOON WHEN CIRCUMSTANCES PERMIT: MODERATE CHANCE OF DOZING
SITING INACTIVE IN A PUBLIC PLACE LIKE A CLASS ROOM OR A MOVIE THEATER: WOULD NEVER DOZE
ESS-CHAD TOTAL SCORE: 7
HOW LIKELY ARE YOU TO NOD OFF OR FALL ASLEEP WHEN YOU ARE A PASSENGER IN A CAR FOR AN HOUR WITHOUT A BREAK: WOULD NEVER DOZE

## 2024-01-08 NOTE — PROGRESS NOTES
[facility] TECH NOTE:     Patient: Sergey South   MRN//AGE: [id number]  1975  48 y.o.   Technologist: Elen Cummins   Room: 105   Service Date: 2024        Sleep Testing Location: [facility]:     TECHNOLOGIST SLEEP STUDY PROCEDURE NOTE:   This sleep study is being conducted according to the policies and procedures outlined by the [employer] accreditation standards.  The sleep study procedure and processes involved during this appointment was explained to the patient/patient’s family, questions were answered. The patient/family verbalized understanding.      The patient is a 48 y.o. year old female scheduled for a CPAP titration.  She arrived for her appointment.      The study that was ultimately completed was a CPAP titration.     The full study Was completed.  Patient questionnaires completed?: yes     Consents signed? yes    Initial Fall Risk Screening:     Sergey has not fallen in the last 6 months. Sergey does not have a fear of falling. She does not need assistance with sitting, standing, or walking. She does not need assistance walking in her home. She does not need assistance in an unfamiliar setting. The patient is not using an assistive device.     Brief Study observations: 48 year old female presents for a CPAP titration study.  Patient was started out at a pressure of 8cmH2O and will be increased by 2cmH2O increments per Dr. Arroyo. Patients ending pressure was 12 cmH2O.  The mask used was a ResMed AirFit F20 size small, the mask fit well with a very small leak.  Patient seemed to have tolerated the pressure well.  Patient woke up during the night and had a hard time falling back asleep.  Patient sat up in bed, ate a snack and read for a little bit.  Patient was able to fall back asleep after her snack and reading.  Patient used the restroom one time during the night.  REM was observed.      Deviation to order/protocol and reason: none      If PAP, which was preferred mask/pressure/mode:  ResMed AirFit F20, size medium.  Starting pressure of 8cmH2O and ending at     Other:None    After the procedure, the patient/family was informed to ensure followup with ordering clinician for testing results.      Technologist: Elen Cummins

## 2024-01-17 ENCOUNTER — OFFICE VISIT (OUTPATIENT)
Dept: PRIMARY CARE | Facility: CLINIC | Age: 49
End: 2024-01-17
Payer: COMMERCIAL

## 2024-01-17 ENCOUNTER — LAB (OUTPATIENT)
Dept: LAB | Facility: LAB | Age: 49
End: 2024-01-17
Payer: COMMERCIAL

## 2024-01-17 VITALS
HEART RATE: 100 BPM | DIASTOLIC BLOOD PRESSURE: 94 MMHG | SYSTOLIC BLOOD PRESSURE: 134 MMHG | TEMPERATURE: 97.1 F | WEIGHT: 246.4 LBS | BODY MASS INDEX: 43.65 KG/M2 | OXYGEN SATURATION: 99 %

## 2024-01-17 DIAGNOSIS — R76.8 ANA POSITIVE: ICD-10-CM

## 2024-01-17 DIAGNOSIS — G47.30 SLEEP APNEA, UNSPECIFIED TYPE: ICD-10-CM

## 2024-01-17 DIAGNOSIS — G45.9 TIA (TRANSIENT ISCHEMIC ATTACK): ICD-10-CM

## 2024-01-17 DIAGNOSIS — E78.00 HYPERCHOLESTEREMIA: ICD-10-CM

## 2024-01-17 DIAGNOSIS — E83.10 DISORDER OF IRON METABOLISM: ICD-10-CM

## 2024-01-17 DIAGNOSIS — L71.9 ROSACEA: Primary | ICD-10-CM

## 2024-01-17 DIAGNOSIS — G47.33 OBSTRUCTIVE SLEEP APNEA: ICD-10-CM

## 2024-01-17 DIAGNOSIS — I10 HYPERTENSION, UNSPECIFIED TYPE: ICD-10-CM

## 2024-01-17 LAB
FERRITIN SERPL-MCNC: 54 NG/ML (ref 8–150)
IRON SATN MFR SERPL: 13 % (ref 25–45)
IRON SERPL-MCNC: 43 UG/DL (ref 35–150)
TIBC SERPL-MCNC: 320 UG/DL (ref 240–445)
UIBC SERPL-MCNC: 277 UG/DL (ref 110–370)

## 2024-01-17 PROCEDURE — 3008F BODY MASS INDEX DOCD: CPT | Performed by: INTERNAL MEDICINE

## 2024-01-17 PROCEDURE — 4010F ACE/ARB THERAPY RXD/TAKEN: CPT | Performed by: INTERNAL MEDICINE

## 2024-01-17 PROCEDURE — 85730 THROMBOPLASTIN TIME PARTIAL: CPT

## 2024-01-17 PROCEDURE — 85613 RUSSELL VIPER VENOM DILUTED: CPT

## 2024-01-17 PROCEDURE — 3080F DIAST BP >= 90 MM HG: CPT | Performed by: INTERNAL MEDICINE

## 2024-01-17 PROCEDURE — 36415 COLL VENOUS BLD VENIPUNCTURE: CPT

## 2024-01-17 PROCEDURE — 1036F TOBACCO NON-USER: CPT | Performed by: INTERNAL MEDICINE

## 2024-01-17 PROCEDURE — 99214 OFFICE O/P EST MOD 30 MIN: CPT | Performed by: INTERNAL MEDICINE

## 2024-01-17 PROCEDURE — 3075F SYST BP GE 130 - 139MM HG: CPT | Performed by: INTERNAL MEDICINE

## 2024-01-17 RX ORDER — DOXYCYCLINE 50 MG/1
50 CAPSULE ORAL 2 TIMES DAILY
Qty: 60 CAPSULE | Refills: 1 | Status: SHIPPED | OUTPATIENT
Start: 2024-01-17 | End: 2024-01-17 | Stop reason: SDUPTHER

## 2024-01-17 RX ORDER — DOXYCYCLINE 50 MG/1
50 CAPSULE ORAL DAILY
Qty: 30 CAPSULE | Refills: 1 | Status: SHIPPED | OUTPATIENT
Start: 2024-01-17 | End: 2024-01-29 | Stop reason: SDUPTHER

## 2024-01-17 ASSESSMENT — ENCOUNTER SYMPTOMS
UNEXPECTED WEIGHT CHANGE: 0
ARTHRALGIAS: 0
BRUISES/BLEEDS EASILY: 0
FATIGUE: 0
ABDOMINAL PAIN: 0
HEADACHES: 0
PALPITATIONS: 0
WHEEZING: 0
BLOOD IN STOOL: 0
DIZZINESS: 0
DIARRHEA: 0
FEVER: 0
COUGH: 0
DIFFICULTY URINATING: 0
SORE THROAT: 0
SINUS PAIN: 0

## 2024-01-17 NOTE — PROGRESS NOTES
Subjective   Patient ID: Sergey South is a 48 y.o. female who presents for Mouth Lesions.    - Patient seen by rheumatology no underlying rheumatological disorder  - Osteoarthritis may use Tylenol for pain  - Recurrent skin rash and blisters on and off patient examination pustular rosacea  Patient started on doxycycline follow-up results closely in 4 weeks  - History of recurrent herpes symptoms improved no recurrence patient reassured  - Obstructive sleep apnea patient awaiting follow-up sleep clinic with Dr. Arroyo for titration studies  Patient seen by sleep clinic Dr. arroyo diagnosis of sleep apnea awaiting titration study and starting CPAP  -Abnormal-brain MRI patient under care of Dr. Santa no significant findings at this time recommendation to follow-up annual MRI for 5 years  -Depression on Pristiq 50 mg needs to continue new medication side effect  Follow-up 3 months  - TIA, no recurrence now patient off Plavix after 21 days continue with aspirin daily as recommended no recurrent TIAs  -Diabetes continue with metformin 1 g twice a day continue low-carb diet controlled blood sugar record from home reviewed range 100 hemoglobin A1c 6 compensated counseled about weight loss  - Hypercholesterolemia continues atorvastatin  - Hypertension controlled  ---Need to proceed with mammogram arrange with screening colonoscopy when patient stable next appointment  Follow-up 4 weeks      Mouth Lesions   Associated symptoms include mouth sores. Pertinent negatives include no fever, no abdominal pain, no diarrhea, no congestion, no ear discharge, no ear pain, no headaches, no sore throat, no cough, no wheezing and no rash.          Review of Systems   Constitutional:  Negative for fatigue, fever and unexpected weight change.   HENT:  Positive for mouth sores. Negative for congestion, ear discharge, ear pain, sinus pain and sore throat.    Eyes:  Negative for visual disturbance.   Respiratory:  Negative for cough and  wheezing.    Cardiovascular:  Negative for chest pain, palpitations and leg swelling.   Gastrointestinal:  Negative for abdominal pain, blood in stool and diarrhea.   Genitourinary:  Negative for difficulty urinating.   Musculoskeletal:  Negative for arthralgias.   Skin:  Negative for rash.   Neurological:  Negative for dizziness and headaches.   Hematological:  Does not bruise/bleed easily.   Psychiatric/Behavioral:  Negative for behavioral problems.    All other systems reviewed and are negative.      Objective   Lab Results   Component Value Date    HGBA1C 6.0 09/13/2023      BP (!) 134/94   Pulse 100   Temp 36.2 °C (97.1 °F)   Wt 112 kg (246 lb 6.4 oz)   SpO2 99%   BMI 43.65 kg/m²   Lab Results   Component Value Date    WBC 13.2 (H) 05/18/2023    HGB 14.3 05/18/2023    HCT 44.0 05/18/2023     05/18/2023    CHOL 139 04/24/2023    TRIG 143 04/24/2023    HDL 18.2 (A) 04/24/2023    ALT 23 05/18/2023    AST 18 05/18/2023     05/18/2023    K 4.4 05/18/2023     05/18/2023    CREATININE 0.67 05/18/2023    BUN 15 05/18/2023    CO2 25 05/18/2023    TSH 2.08 04/24/2023    INR CANCELED 04/24/2023    HGBA1C 6.0 09/13/2023     par   Physical Exam  Vitals and nursing note reviewed.   Constitutional:       Appearance: Normal appearance.   HENT:      Head: Normocephalic.      Nose: Nose normal.   Eyes:      Conjunctiva/sclera: Conjunctivae normal.      Pupils: Pupils are equal, round, and reactive to light.   Cardiovascular:      Rate and Rhythm: Regular rhythm.   Pulmonary:      Effort: Pulmonary effort is normal.      Breath sounds: Normal breath sounds.   Abdominal:      General: Abdomen is flat.      Palpations: Abdomen is soft.   Musculoskeletal:      Cervical back: Neck supple.   Skin:     General: Skin is warm.      Findings: Lesion (Rosacea) present.   Neurological:      General: No focal deficit present.      Mental Status: She is oriented to person, place, and time.   Psychiatric:         Mood  and Affect: Mood normal.         Assessment/Plan   Sergey was seen today for mouth lesions.  Diagnoses and all orders for this visit:  Rosacea (Primary)  -     Discontinue: doxycycline (Monodox) 50 mg capsule; Take 1 capsule (50 mg) by mouth 2 times a day. Take with at least 8 ounces (large glass) of water, do not lie down for 30 minutes after  -     doxycycline (Monodox) 50 mg capsule; Take 1 capsule (50 mg) by mouth once daily. Take with at least 8 ounces (large glass) of water, do not lie down for 30 minutes after  Obstructive sleep apnea  Hypercholesteremia  TIA (transient ischemic attack)  Hypertension, unspecified type  Sleep apnea, unspecified type  Other orders  -     Follow Up In Primary Care - Established   - Patient seen by rheumatology no underlying rheumatological disorder  - Osteoarthritis may use Tylenol for pain  - Recurrent skin rash and blisters on and off patient examination pustular rosacea  Patient started on doxycycline follow-up results closely in 4 weeks  - History of recurrent herpes symptoms improved no recurrence patient reassured  - Obstructive sleep apnea patient awaiting follow-up sleep clinic with Dr. Arroyo for titration studies  Patient seen by sleep clinic Dr. arroyo diagnosis of sleep apnea awaiting titration study and starting CPAP  -Abnormal-brain MRI patient under care of Dr. Santa no significant findings at this time recommendation to follow-up annual MRI for 5 years  -Depression on Pristiq 50 mg needs to continue new medication side effect  Follow-up 3 months  - TIA, no recurrence now patient off Plavix after 21 days continue with aspirin daily as recommended no recurrent TIAs  -Diabetes continue with metformin 1 g twice a day continue low-carb diet controlled blood sugar record from home reviewed range 100 hemoglobin A1c 6 compensated counseled about weight loss  - Hypercholesterolemia continues atorvastatin  - Hypertension controlled  ---Need to proceed with mammogram arrange  with screening colonoscopy when patient stable next appointment  Follow-up 4 weeks

## 2024-01-18 LAB
DRVVT SCREEN TO CONFIRM RATIO: 1.12 RATIO
DRVVT/DRVVT CFM NRMLZD PPP-RTO: 1.11 RATIO
DRVVT/DRVVT CFM P DOAC NEUT NORM PPP-RTO: 1.01 RATIO
LA 2 SCREEN W REFLEX-IMP: NORMAL
NORMALIZED SCT PPP-RTO: 1.02 RATIO
SILICA CLOTTING TIME CONFIRMATION: 1.37 RATIO
SILICA CLOTTING TIME SCREEN: 1.39 RATIO

## 2024-01-26 ENCOUNTER — OFFICE VISIT (OUTPATIENT)
Dept: SLEEP MEDICINE | Facility: CLINIC | Age: 49
End: 2024-01-26
Payer: COMMERCIAL

## 2024-01-26 VITALS
HEIGHT: 63 IN | BODY MASS INDEX: 43.59 KG/M2 | RESPIRATION RATE: 18 BRPM | DIASTOLIC BLOOD PRESSURE: 70 MMHG | OXYGEN SATURATION: 97 % | HEART RATE: 81 BPM | SYSTOLIC BLOOD PRESSURE: 144 MMHG | WEIGHT: 246 LBS

## 2024-01-26 DIAGNOSIS — E66.01 MORBID OBESITY WITH BMI OF 40.0-44.9, ADULT (MULTI): ICD-10-CM

## 2024-01-26 DIAGNOSIS — I10 PRIMARY HYPERTENSION: ICD-10-CM

## 2024-01-26 DIAGNOSIS — G25.81 RLS (RESTLESS LEGS SYNDROME): ICD-10-CM

## 2024-01-26 DIAGNOSIS — J30.2 SEASONAL ALLERGIES: ICD-10-CM

## 2024-01-26 DIAGNOSIS — G47.33 OBSTRUCTIVE SLEEP APNEA: Primary | ICD-10-CM

## 2024-01-26 PROCEDURE — 99214 OFFICE O/P EST MOD 30 MIN: CPT | Performed by: INTERNAL MEDICINE

## 2024-01-26 PROCEDURE — 3077F SYST BP >= 140 MM HG: CPT | Performed by: INTERNAL MEDICINE

## 2024-01-26 PROCEDURE — 3008F BODY MASS INDEX DOCD: CPT | Performed by: INTERNAL MEDICINE

## 2024-01-26 PROCEDURE — 4010F ACE/ARB THERAPY RXD/TAKEN: CPT | Performed by: INTERNAL MEDICINE

## 2024-01-26 PROCEDURE — 1036F TOBACCO NON-USER: CPT | Performed by: INTERNAL MEDICINE

## 2024-01-26 PROCEDURE — 3078F DIAST BP <80 MM HG: CPT | Performed by: INTERNAL MEDICINE

## 2024-01-26 ASSESSMENT — SLEEP AND FATIGUE QUESTIONNAIRES
HOW LIKELY ARE YOU TO NOD OFF OR FALL ASLEEP WHILE SITTING AND TALKING TO SOMEONE: WOULD NEVER DOZE
SITING INACTIVE IN A PUBLIC PLACE LIKE A CLASS ROOM OR A MOVIE THEATER: WOULD NEVER DOZE
HOW LIKELY ARE YOU TO NOD OFF OR FALL ASLEEP WHILE WATCHING TV: SLIGHT CHANCE OF DOZING
HOW LIKELY ARE YOU TO NOD OFF OR FALL ASLEEP WHILE SITTING QUIETLY AFTER LUNCH WITHOUT ALCOHOL: SLIGHT CHANCE OF DOZING
ESS-CHAD TOTAL SCORE: 8
HOW LIKELY ARE YOU TO NOD OFF OR FALL ASLEEP WHILE SITTING AND READING: HIGH CHANCE OF DOZING
HOW LIKELY ARE YOU TO NOD OFF OR FALL ASLEEP IN A CAR, WHILE STOPPED FOR A FEW MINUTES IN TRAFFIC: WOULD NEVER DOZE
HOW LIKELY ARE YOU TO NOD OFF OR FALL ASLEEP WHEN YOU ARE A PASSENGER IN A CAR FOR AN HOUR WITHOUT A BREAK: WOULD NEVER DOZE
HOW LIKELY ARE YOU TO NOD OFF OR FALL ASLEEP WHILE LYING DOWN TO REST IN THE AFTERNOON WHEN CIRCUMSTANCES PERMIT: HIGH CHANCE OF DOZING

## 2024-01-26 ASSESSMENT — PATIENT HEALTH QUESTIONNAIRE - PHQ9
1. LITTLE INTEREST OR PLEASURE IN DOING THINGS: NOT AT ALL
SUM OF ALL RESPONSES TO PHQ9 QUESTIONS 1 AND 2: 0
2. FEELING DOWN, DEPRESSED OR HOPELESS: NOT AT ALL

## 2024-01-26 ASSESSMENT — PAIN SCALES - GENERAL: PAINLEVEL: 3

## 2024-01-26 NOTE — PROGRESS NOTES
[facility] SLEEP MED[facility]INE [facility]IN[facility]  FOLLOW-UP VISIT NOTE    PCP: Eliana Knowles MD    HISTORY OF PRESENT ILLNESS     Patient ID: Sergey South is a 48 y.o. female who presents for follow-up after sleep study    Patient is here alone today.  To review, patient's medical history is notable for morbid obesity (BMI 44), HTN, seasonal allergies, CNS demyelination, and BRIE.     Interval History  Patient was last seen in 10/13/2023. Plan was to do titration study to evaluate effective pressure settings.  Since last visit, patient had completed sleep study which showed optimal pressure at CPAP 12 cm H2O.     RLS Follow-up:   RLS symptoms once a week. It does not affect sleep onset nor wakes her up from sleep. Since last visit, ferritin was collected and noted to be low at 54; hence, iron supplementation was started. After iron pills, her aches and pains are much better and she denies RLS symptoms in 2 weeks.     SLEEP STUDY HISTORY (personally reviewed raw data such as interpretation report, data sheet, hypnogram, and titration table if available and applicable)  HST 8/25/2023: AHI3% 69.4, AHI4% 59.1, SpO2 ivania 71.5%, spent 94.6 mins with SpO2<89%   PAP titration 1/7/2024: CPAP was started at 8-12 cm H2O. At CPAP 12 cm H2O with REM non-supine sleep, RDI 1.7, REM RDI 1.7, SpO2 ivania 93%    SLEEP-WAKE SCHEDULE  Bedtime: 2 AM daily  Subjective sleep latency: 30-60 mins  Difficulty falling asleep: yes due to rumination  number of awakenings per night: wakes up every 2 hours to go to bathroom   Nocturia: yes  Falls back asleep right away  Difficulty staying asleep: yes due to nocturia  Final wakeup time: 8 AM daily  Get out of bed time: 8 AM daily  Shift work: no  Naps: once daily for 2 hours from 10 AM to 12 NN  Feels refreshed after a nap: no  Average sleep duration (excluding naps): 4-6 hours    SLEEP ENVIRONMENT  Sleep location: bed  Sleep status: sleeps alone during work nights and sleeps with  during  "naps  Preferred sleep position: Side    SOCIAL HISTORY  Smoking: no  ETOH: no  Marijuana: no  Caffeine: 1 cup coffee daily  Sleep aids: yes on melatonin with Valerian root    WEIGHT: stable    Claustrophobia: Yes     Active Problems, Allergy List, Medication List, and PMH/PSH/FH/Social Hx have been reviewed and reconciled in chart. No significant changes unless documented in the pertinent chart section. Updates made when necessary.     PHYSICAL EXAM     VITAL SIGNS: /70   Pulse 81   Resp 18   Ht 1.6 m (5' 3\")   Wt 112 kg (246 lb)   SpO2 97%   BMI 43.58 kg/m²     NECK CIRCUMFERENCE: 17.75 inches    Today ESS: 8  Last visit ESS: 8  NELDA: 19    Physical Exam  Constitutional: Awake, not in distress  Head: normocephalic, atraumatic  Skin: Warm, no rash  Neuro: No tremors, moves all extremities  Psych: alert and oriented to time, place, and person    RESULTS/DATA     Iron (ug/dL)   Date Value   01/17/2024 43   10/16/2023 64     % Saturation (%)   Date Value   01/17/2024 13 (L)   10/16/2023 20 (L)     TIBC (ug/dL)   Date Value   01/17/2024 320   10/16/2023 328     Ferritin (ng/mL)   Date Value   01/17/2024 54   10/16/2023 59       Bicarbonate   Date Value Ref Range Status   05/18/2023 25 21 - 32 mmol/L Final       ASSESSMENT/PLAN     Sergey South is a 48 y.o. female who returns in [facility] for the following problems:    OBSTRUCTIVE SLEEP APNEA, SEVERE (HSAT AHI3% 69.4, AHI4% 59.1 )  SLEEP-RELATED HYPOXEMIA  - Personally reviewed the sleep study's raw data such as interpretation report, data sheet, and hypnogram. Discussed results with patient as well as treatment options. All questions answered. A copy of recent testing was either given to patient or released in [employer]. See HPI for detailed summary of sleep study results.  - Recommend starting auto-CPAP 8-14 cm H2O with EPR 3, heated humidification, heated tubings, and mask used in sleep lab (Resmed Airfit F20 full face " mask). Orders sent to [employer].  - Discussed 30-day mask guarantee and insurance requirement regarding PAP compliance and follow-up.   - Advised about cleaning instructions and replacement schedule of PAP accessories.  - Supportive management as follows: Lose weight. Stay off your back when sleeping. Avoid smoking, alcohol, and sedating medications if applicable. Don't drive when sleepy.    RESTLESS LEG SYNDROME  - 1/7/2024 Ferritin 54, TSAT 13%  - No recurrence of RLS symptoms after starting iron supplementation  - Continue iron supplementation for 3 months and repeat testing for ferritin and TSAT  - Discussed that RLS is a clinical diagnosis. Discussed triggers of RLS such as caffeine, alcohol, nicotine, medications (antidepressants except Buproprion and Trazodone, 1st generation antihistamines, antipsychotics, anti-emetics except ondansetron), low iron  - Supportive management: Avoid triggers of RLS. Taper off or reduce dose of medications that can cause RLS or switch them to Bupropion if feasible. May take warm bath 2 hours before bedtime. Massage and/or stretch legs while in bed    SEASONAL ALLERGIES   - Start fluticasone nasal spray 2 sprays per nostril once daily 1 hour before bedtime.  - If there is inadequate or lack of improvement on the above intranasal steroid spray, consider adding Azelastine nasal spray, 2 sprays per nostril once daily in the morning.   - Alternatively, may add cetirizine or loratadine 10 mg once daily especially if patient also has watery eyes.     MORBID OBESITY  - BMI 43 today  - Recommend weight loss with diet and exercise.  - Weight loss can help in long term management of BRIE.  - Defer management to PCP    HYPERTENSION  - BP slightly high today. Untreated  or inadequately treated sleep apnea can lead to new onset hypertension, resistant hypertension, or refractory hypertension.  - Continue anti-hypertensive medications per PCP.  - Supportive management: low salt DASH  diet (less than 2000 mg sodium intake daily), moderate intensity aerobic exercise at least 30 minutes 5 days per week, reduce stress, quit smoking, limit alcohol, lose weight, and monitor BP once daily  - PCP following. Defer management to PCP          All of patient's questions were answered. She verbalizes understanding and agreement with my assessment and plan. Refer to after-visit-summary (AVS) for patient education and if applicable, for more details on troubleshooting issues with PAP usage, proper cleaning instructions of PAP supplies, and usual recommended replacement schedule for each of the PAP supplies.     Follow-up in 31-90 days after getting new machine.

## 2024-01-29 DIAGNOSIS — L71.9 ROSACEA: ICD-10-CM

## 2024-01-29 RX ORDER — DOXYCYCLINE 50 MG/1
50 CAPSULE ORAL DAILY
Qty: 30 CAPSULE | Refills: 1 | Status: SHIPPED | OUTPATIENT
Start: 2024-01-29 | End: 2024-02-19 | Stop reason: SDUPTHER

## 2024-02-04 DIAGNOSIS — I10 HYPERTENSION, UNSPECIFIED TYPE: ICD-10-CM

## 2024-02-06 RX ORDER — LOSARTAN POTASSIUM 50 MG/1
50 TABLET ORAL DAILY
Qty: 90 TABLET | Refills: 0 | Status: SHIPPED | OUTPATIENT
Start: 2024-02-06 | End: 2024-05-06

## 2024-02-09 NOTE — PATIENT INSTRUCTIONS
"Thank you for coming to the Sleep Medicine Clinic today! Your sleep medicine provider today was: Mere Arroyo MD Below is a summary of your treatment plan, patient education, other important information, and our contact numbers.      TREATMENT PLAN     - Start auto-CPAP. Order placed and sent to [facility].  - Please read the \"Patient Education\" section below for more detailed information. Try implementing tips, reminders, strategies, and supportive management.   - If not yet done, please sign up for [employer] to make a future schedule, send prescription requests, or send messages.    Follow-up Appointment:   Follow-up in 31-90 days after getting new machine.    PATIENT EDUCATION     Obstructive Sleep Apnea (BRIE) is a sleep disorder where your upper airway muscles relax during sleep and the airway intermittently and repetitively narrows and collapses leading to partially blocked airway (hypopnea) or completely blocked airway (apnea) which, in turn, can disrupt breathing in sleep, lower oxygen levels while you sleep and cause night time wakings. Because both apnea and hypopnea may cause higher carbon dioxide or low oxygen levels, untreated BRIE can lead to heart arrhythmia, elevation of blood pressure, and make it harder for the body to consolidate memory and facilitate metabolism (leading to higher blood sugars at night). Frequent partial arousals occur during sleep resulting in sleep deprivation and daytime sleepiness. BRIE is associated with an increased risk of cardiovascular disease, stroke, hypertension, and insulin resistance. Moreover, untreated BRIE with excessive daytime sleepiness can increase the risk of motor vehicular accidents.    Some conservative strategies for BRIE regardless of BRIE severity are:   Positional therapy - Avoid sleeping on your back.   Healthy diet and regular exercise to optimize weight is highly encouraged.   Avoid alcohol late in the evening and sedative-hypnotics as these " substances can make sleep apnea worse.   Improve breathing through the nose with intranasal steroid spray, saline rinse, or antihistamines    Safety: Avoid driving vehicle and operating heavy equipment while sleepy. Drowsy driving may lead to life-threatening motor vehicle accidents. A person driving while sleepy is 5 times more likely to have an accident. If you feel sleepy, pull over and take a short power nap (sleep for less than 30 minutes). Otherwise, ask somebody to drive you.    Treatment options for sleep apnea include weight management, positional therapy, Positive Airway Therapy (PAP) therapy, oral appliance therapy, hypoglossal nerve stimulator (Inspire) and select airway surgeries.    Starting Positive Airway Pressure (PAP): You were ordered a device to wear when you sleep called PAP (Positive Airway Pressure) to treat your sleep apnea. The order will be submitted to a durable medical equipment (DME) company who will arrange setting you up with the device. They will provide all the necessary equipment and discuss use and maintenance of the device with you as well as mask fitting and process of replacing / renewing PAP supplies or accessories. Once you get the machine, please start using it immediately. You may not be successful right away and that is okay. Biggers be certain that you keep trying nightly and reach out to DME if you are struggling or having issues with machine usage.     *Please follow-up with me in 1-2 months of starting CPAP to see how well it is working for you and to do some troubleshooting if needed. Also, please bring all PAP equipment with you to follow up appointments unless told otherwise.     Important things to keep in mind as you start PAP:  Insurance will monitor your usage during the first 90 days. You should use your PAP - all night, every night, and including all naps (especially if naps are more than 30 minutes) for your health. The bare minimum is to use your PAP device  while sleeping for at least 4 hours per day at least 5-6 days per week.. Otherwise, your PAP device will be reclaimed by your DME company at 90 days.  There are many masks to choose from to wear with your PAP machine. If you are not comfortable with the first mask issued to you, call your DME company and ask for another option to try. You typically have a 30-day mask guarantee from the day you received your machine.   Discuss with your provider if you are having issues breathing with the machine or if the temperature or humidity feel uncomfortable.  Expect to have an adjustment period when you start your device. It helps to continuing wearing the machine every day for a period of time until you get more used to it. You can practice with wearing the mask alone if you need, then add in the PAP air pressure a few days later.   Reach out for help if you are struggling! The sleep medicine department can be reached at [phone]  We encourage you to download data monitoring apps to your phone. For [employer] 10/11 - MyAir edith. For [employer] - DreamMapper. Both apps are available in the [facility] store for free and are a great tool to monitor your progress with your PAP device night to night.    Tips for success with PAP machine usage:  Comfortable and well-fitting mask  Appropriate pressure on the machine  Using humidification  Support from bed partner and clinical team      Maintaining your CPAP/BPAP device:    The humidification chamber (aka water tank or water chamber) needs to be filled with distilled water to prevent buildup of white deposits in the future. If you cannot find distilled water, you can use tap water but expect to have white deposits buildup seen after prolonged use with tap water. If you start seeing white deposits on the water chamber, you can clean it by filling it with equal parts of distilled white vinegar and water. Let the vinegar-water mixture sit for 2 hours, and then rinse  it with running tap water. Clean with soap and water then let it dry.     You should try to keep your machine clean in order to work well. Here are some tips to clean PAP supplies / accessories:    Clean the humidification chamber (aka water tank) as well as your mask and tubing at least once a week with soap and water.   Alternatively, you can fill a sink or basin with warm water and add a little mild detergent, like Ivory dish soap. Gently wipe your supplies with the soapy water to free all the oils and dirt that may have collected. Once that's done, rinse these items with clean water until the soap is gone and let them air dry. You can hang your tubing over the curtain merlyn in your bathroom so that it dries.  The mask insert (part of the mask that has contact with your skin) needs to be cleaned with soap and water daily. Another option is to wipe them down with CPAP wipes or baby wipes.    You should replace your mask and tubing frequently in order to prevent bacteria buildup, machine damage, and mask seal issues. The older the mask and hose, the high likelihood that there is bacteria buildup in it especially if they are not cleaned regularly. Dirty filters damage machines because build-up of dust and contaminants can cause machine to over-heat, and in time, damage the motor of machine. Cushions lose their seal over time as most masks are made of plastic and silicone while headgear is made of neoprene. These materials will break down with age and frequent use. Here is the recommended replacement schedule for PAP supplies / accessories:    Twice a month- disposable filters and cushions for nasal mask or nasal pillows.  Once a month- cushion for full face mask  Every 3 months- mask with headgear and PAP tubing (standard or heated hose)  Every 6 months- reusable filter, water chamber, and chin strap     Other useful information:    Some people do not put water in the tank while other people prefers to put water in  the tank to prevent mouth dryness. Try to experiment to determine which is more comfortable for you.   In general, new machines have 2 years warranty on parts while health insurance allows you to have a new machine once every 5 years.     Common issues with PAP machine:    Mask gets dislodged when turning to the side: Consider getting a CPAP pillow or switching to a mask with hose on top.     Dry mouth:  Your machine has built-in humidifier that heats up the air to prevent dry mouth. It can be adjusted to your comfort. You can try that first and increase setting one level one night at a time to check which setting is comfortable and effective in lessening dry mouth. In some patients with heated hose, adjusting tube temperature to make air warmer can improve dry mouth. If dry mouth persists despite adjusting humidity or tube temperature setting, may apply OTC Biotene gel over the gums at bedtime.  If Biotene gel is not effective, consider trying XEROSTOM gel from [employer].  Also, eliminate or reduce dose of medications that can cause dry mouth if possible. Lastly, may try getting a separate room humidifier machine.    Airleaks: Please call DME as they may need to adjust your mask or refit you with a different kind or different size of mask. In addition, you can ask DME for tips on getting a good mask seal and mask fit.     Difficulty tolerating the mask: Contact your DME to try a different kind of mask and/or call office to get a referral to Sleep Psychologist for CPAP desensitization. CPAP desensitization technique is a set of strategies that helps patient cope with claustrophobia and anxiety related to wearing mask. Alternatively, we can do a daytime mini-sleep study called PAP-nap trial wherein you will try on different kinds of mask and the sleep technician will try different pressure settings on CPAP and BPAP machines to see which specific pressure is tolerable and comfortable for you.     Water droplets or  "moisture within the hose and/or mask: This is called rain-out and it is caused by condensation of too much heated humidity on the cooler walls of the hose. If you have rain-out, turn down humidity settings or get a heated hose. If you already have a heated hose, turn up the \"tube temperature\" of the heated hose. Alternatively, if you don't want to get a heated hose or warmer air, may wrap the CPAP hose with stockings to keep it somewhat warm. Also, you need to place the machine on the floor and lower the hose so that water won't travel upward towards your mask.     PAP desensitization techniques: If you have concerns about something being on your face at night, you can start by getting used to it before trying to sleep with it as follows:      Sit in a comfortable chair or bed. Connect the mask and hose to the CPAP/BiPAP machine. Hold the mask on your face (without straps on) and turn on the machine. Practice breathing with the mask on while awake sitting and watching television, reading, or performing a sedentary activity during the day for 5-10 minutes and then take it off.  If tolerated, try again and gradually build up to longer periods of time. If not tolerated, try and try again until it is more comfortable as you become more desensitized. If you are able to use it for at least 20-30 minutes, move unto the next step.     Sit in a comfortable chair or bed. Connect the mask and hose to the CPAP/BiPAP machine. Strap the mask on your head and turn on the machine. Practice breathing with the mask and headgear on while awake sitting and watching television, reading, or performing a sedentary activity. Start with 5-10 minutes and gradually increasing time until you can wear it comfortably for at least 20-30 minutes, then move to the next step.    Take a shorter daytime nap with machine turned on while you are in a reclined position in bed, sofa, or recliner. Start with 5-10 minute nap and gradually increase up to 30 " minutes. It is not important whether you fall asleep or not. The goal is to rest comfortably with PAP machine on.     Reintroduce PAP machine into nighttime sleep. You can begin using it a portion of the night and gradually increase up to entire night.     Proceed from one step to the next only when you are completely comfortable. If you feel any anxiety or discomfort, return to the previous step, then proceed again when comfortable.    Expect to “work” with your CPAP/BIPAP unit. It is important to try to relax when beginning CPAP/BIPAP therapy. Inhalation and exhalation should occur through the nose only. If you are unable to consistently breathe this way, do not panic or lose hope. There are other types of masks which allow you to breathe through your nose and/or your mouth. Also, in some patients, using intranasal steroid spray (e.g. Flonase or Nasocort or Fluticasone) 1 hour before bedtime and/or before putting on CPAP mask can help tolerate breathing through the mask.    Don't give up after a few attempts--some patients adjust quickly, while some patients need 3-4 weeks (or sometimes even longer) to be accustomed to CPAP therapy.  Contact your sleep medicine specialist if you have a significant change in weight since this may affect your pressure.    You can also go to the following EDUCATION WEBSITES for further information:   American Academy of Sleep Medicine http://sleepeducation.org  National Sleep Foundation: https://sleepfoundation.org  American Sleep Apnea Association: https://www.sleepapnea.org (for patients with sleep apnea)  Narcolepsy Network: https://www.narcolepsynetwork.org (for patients with narcolepsy)  [employer]: https://www.wakeupnarcolepsy.org (for patients with narcolepsy)  Hypersomnia Foundation: https://www.hypersomniafoundation.org (for patients with idiopathic hypersomnia)  RLS foundation: https://www.rls.org (for patients with restless leg syndrome)    IMPORTANT INFORMATION      Call 911 for medical emergencies.  Our offices are generally open from Monday-Friday, 8 am - 5 pm.   There are no supporting services by either the sleep doctors or their staff on weekends and Holidays, or after 5 PM on weekdays.   If you need to get in touch with me, you may either call my office number or you can use [employer].  If a referral for a test, for CPAP, or for another specialist was made, and you have not heard about scheduling this within a week, please call scheduling at [phone] ([phone]).  If you are unable to make your appointment for clinic or an overnight study, kindly call the office or sleep testing center at least 48 hours in advance to cancel and reschedule.  If you are on CPAP, please bring your device's card and/or the device to each clinic appointment.   In case of problems with PAP machine or mask interface, please contact your DME (Durable Medical Equipment) company first. DME is the company who provides you the machine and/or PAP supplies.       PRESCRIPTIONS     We require 7 days advanced notice for prescription refills. If we do not receive the request in this time, we cannot guarantee that your medication will be refilled in time.    IMPORTANT PHONE NUMBERS     Sleep Medicine Clinic Fax: [phone]  Appointments (for Pediatric Sleep Clinic): [phone] - option 1  Appointments (for Adult Sleep Clinic): [phone] - option 2  Appointments (For Sleep Studies): [phone] - option 3  Behavioral Sleep Medicine: [phone]  Sleep Surgery: [phone]  Nutrition Service: [phone]  Weight management clinics with endocrinology: [phone]  Bariatric Services: [phone] (includes weight loss medications and weight loss surgery)  [facility]: [phone] (offers holistic approaches to weight management)  ENT (Otolaryngology): [phone]  Headache Clinic (Neurology): [phone]  Neurology: [phone]  Psychiatry:  [phone]  Pulmonary Function Testing (PFT) Center: [phone]  Pulmonary Medicine: [phone]  Medical Service [facility] ([facility]): [phone]      OUR SLEEP TESTING LOCATIONS     Our team will contact you to schedule your sleep study, however, you can contact us as follow:  Main Phone Line (scheduling only): [phone] ([phone]), option 3  Adult and Pediatric Locations  [facility] (6 years and older): [facility] [address] - 4th floor ([address] [facility]) After hours line: [phone]  [facility] (Main campus: All ages): [facility], 6th floor. After hours line: [phone]   [place] (5 years and older; younger considered on case-by-case basis): [address]; [address], [address]. Scheduling  After hours line: [phone]   [place] (6 years and older): [address]; [address]; [address]   [place] (6 years and older): [address] [facility]  After hours line: [phone]   [place] (13 years and older) in [place]: [address], 2nd floor  After hours line: [phone]   [place] (13 year and older): [address]  After hours line: [phone]     Adult Only Locations:   [place] (18 years and older): [address] [facility], 2nd floor   [place] (18 years and older): [address] [facility]; 4th floor  After hours line: [phone]  [facility] (18 years and older) at [place]: [address] [facility]  After hours line: [phone]     CONTACTING YOUR SLEEP MEDICINE PROVIDER AND SLEEP TEAM      For issues with your machine or mask interface, please call your DME provider first. DME stands for durable medical company. DME is the company who provides you the machine and/or PAP supplies / accessories.   To schedule, cancel, or reschedule SLEEP STUDY APPOINTMENTS, please call the Main Phone Line at [phone] - option 3.   To schedule, cancel, or reschedule CLINIC  "APPOINTMENTS, you can do it in \"MyChart\", call [phone] to speak with my  (Yesi Davis), or call the Main Phone Line at [phone] - option 2  For CLINICAL QUESTIONS or MEDICATION REFILLS, please call direct line for Adult Sleep Nurses at [phone].   Lastly, you can also send a message directly to your provider through \"My Chart\", which is the email service through your  Records Account: [email or web address]       Here at [facility], we wish you a restful sleep!    " Cheiloplasty (Less Than 50%) Text: A decision was made to reconstruct the defect with a  cheiloplasty.  The defect was undermined extensively.  Additional orbicularis oris muscle was excised with a 15 blade scalpel.  The defect was converted into a full thickness wedge, of less than 50% of the vertical height of the lip, to facilite a better cosmetic result.  Small vessels were then tied off with 5-0 monocyrl. The orbicularis oris, superficial fascia, adipose and dermis were then reapproximated.  After the deeper layers were approximated the epidermis was reapproximated with particular care given to realign the vermilion border.

## 2024-02-14 DIAGNOSIS — G37.9 CNS DEMYELINATION (MULTI): Primary | ICD-10-CM

## 2024-02-19 ENCOUNTER — OFFICE VISIT (OUTPATIENT)
Dept: PRIMARY CARE | Facility: CLINIC | Age: 49
End: 2024-02-19
Payer: COMMERCIAL

## 2024-02-19 VITALS
TEMPERATURE: 96.2 F | BODY MASS INDEX: 43.61 KG/M2 | HEART RATE: 99 BPM | WEIGHT: 246.2 LBS | DIASTOLIC BLOOD PRESSURE: 62 MMHG | SYSTOLIC BLOOD PRESSURE: 128 MMHG | OXYGEN SATURATION: 98 %

## 2024-02-19 DIAGNOSIS — M79.10 MYALGIA: ICD-10-CM

## 2024-02-19 DIAGNOSIS — G47.33 OBSTRUCTIVE SLEEP APNEA: ICD-10-CM

## 2024-02-19 DIAGNOSIS — G45.9 TIA (TRANSIENT ISCHEMIC ATTACK): ICD-10-CM

## 2024-02-19 DIAGNOSIS — I10 HYPERTENSION, UNSPECIFIED TYPE: ICD-10-CM

## 2024-02-19 DIAGNOSIS — L71.9 ROSACEA: Primary | ICD-10-CM

## 2024-02-19 DIAGNOSIS — F32.A DEPRESSION, UNSPECIFIED DEPRESSION TYPE: ICD-10-CM

## 2024-02-19 DIAGNOSIS — Z12.31 ENCOUNTER FOR SCREENING MAMMOGRAM FOR MALIGNANT NEOPLASM OF BREAST: ICD-10-CM

## 2024-02-19 DIAGNOSIS — Z12.11 SCREENING FOR COLON CANCER: ICD-10-CM

## 2024-02-19 PROBLEM — F41.9 ANXIETY: Status: ACTIVE | Noted: 2024-02-19

## 2024-02-19 PROBLEM — B00.9 HERPES SIMPLEX TYPE 1 INFECTION: Status: RESOLVED | Noted: 2023-10-16 | Resolved: 2024-02-19

## 2024-02-19 PROCEDURE — 99214 OFFICE O/P EST MOD 30 MIN: CPT | Performed by: INTERNAL MEDICINE

## 2024-02-19 PROCEDURE — 1036F TOBACCO NON-USER: CPT | Performed by: INTERNAL MEDICINE

## 2024-02-19 PROCEDURE — 3008F BODY MASS INDEX DOCD: CPT | Performed by: INTERNAL MEDICINE

## 2024-02-19 PROCEDURE — 3074F SYST BP LT 130 MM HG: CPT | Performed by: INTERNAL MEDICINE

## 2024-02-19 PROCEDURE — 3078F DIAST BP <80 MM HG: CPT | Performed by: INTERNAL MEDICINE

## 2024-02-19 PROCEDURE — 4010F ACE/ARB THERAPY RXD/TAKEN: CPT | Performed by: INTERNAL MEDICINE

## 2024-02-19 RX ORDER — DESVENLAFAXINE 50 MG/1
50 TABLET, EXTENDED RELEASE ORAL DAILY
Qty: 30 TABLET | Refills: 11 | Status: SHIPPED | OUTPATIENT
Start: 2024-02-19 | End: 2025-02-18

## 2024-02-19 RX ORDER — DOXYCYCLINE 50 MG/1
50 CAPSULE ORAL DAILY
Qty: 30 CAPSULE | Refills: 1 | Status: SHIPPED | OUTPATIENT
Start: 2024-02-19 | End: 2024-04-19

## 2024-02-19 ASSESSMENT — ENCOUNTER SYMPTOMS
FATIGUE: 1
DIFFICULTY URINATING: 0
SORE THROAT: 0
COUGH: 0
PALPITATIONS: 0
BRUISES/BLEEDS EASILY: 0
FEVER: 0
HEADACHES: 0
ARTHRALGIAS: 0
ABDOMINAL PAIN: 0
WHEEZING: 0
BLOOD IN STOOL: 0
DIZZINESS: 0
DIARRHEA: 0
SINUS PAIN: 0
UNEXPECTED WEIGHT CHANGE: 0
HYPERTENSION: 1

## 2024-02-19 NOTE — PROGRESS NOTES
Subjective   Patient ID: Sergey South is a 48 y.o. female who presents for Diabetes, Hyperlipidemia, Hypertension, and Rosacea.     - Osteoarthritis may use Tylenol for pain  -Skin rash and blisters resolved with doxycycline  - Need to proceed with screening mammogram  --Needs to proceed with screening colonoscopy order placed today- Obstructive sleep apnea patient awaiting follow-up sleep clinic with Dr. Arroyo for titration studies  Patient seen by sleep clinic Dr. arroyo diagnosis of sleep apnea awaiting titration study and starting CPAP  -Abnormal-brain MRI patient under care of Dr. Santa no significant findings at this time recommendation to follow-up annual MRI for 5 years  -Depression on Pristiq 50 mg needs to continue new medication side effect needs to continue  - TIA, no recurrence now patient off Plavix after 21 days continue with aspirin daily as recommended no recurrent TIAs  -Diabetes continue with metformin 1 g twice a day continue low-carb diet controlled blood sugar record from home reviewed range 100 hemoglobin A1c 6 compensated counseled about weight loss  - Hypercholesterolemia continues atorvastatin  - Hypertension controlled  Follow-up 6 months    Diabetes  Pertinent negatives for hypoglycemia include no dizziness or headaches. Associated symptoms include fatigue. Pertinent negatives for diabetes include no chest pain.   Hyperlipidemia  Pertinent negatives include no chest pain.   Hypertension  Pertinent negatives include no chest pain, headaches or palpitations.          Review of Systems   Constitutional:  Positive for fatigue. Negative for fever and unexpected weight change.   HENT:  Negative for congestion, ear discharge, ear pain, mouth sores, sinus pain and sore throat.    Eyes:  Negative for visual disturbance.   Respiratory:  Negative for cough and wheezing.    Cardiovascular:  Negative for chest pain, palpitations and leg swelling.   Gastrointestinal:  Negative for abdominal pain, blood  in stool and diarrhea.   Genitourinary:  Negative for difficulty urinating.   Musculoskeletal:  Negative for arthralgias.   Skin:  Positive for rash.   Neurological:  Negative for dizziness and headaches.   Hematological:  Does not bruise/bleed easily.   Psychiatric/Behavioral:  Negative for behavioral problems.    All other systems reviewed and are negative.      Objective   Lab Results   Component Value Date    HGBA1C 6.0 09/13/2023      /62   Pulse 99   Temp 35.7 °C (96.2 °F)   Wt 112 kg (246 lb 3.2 oz)   SpO2 98%   BMI 43.61 kg/m²     Physical Exam  Vitals and nursing note reviewed.   Constitutional:       Appearance: Normal appearance.   HENT:      Head: Normocephalic.      Nose: Nose normal.   Eyes:      Conjunctiva/sclera: Conjunctivae normal.      Pupils: Pupils are equal, round, and reactive to light.   Cardiovascular:      Rate and Rhythm: Regular rhythm.   Pulmonary:      Effort: Pulmonary effort is normal.      Breath sounds: Normal breath sounds.   Abdominal:      General: Abdomen is flat.      Palpations: Abdomen is soft.   Musculoskeletal:      Cervical back: Neck supple.   Skin:     General: Skin is warm.      Findings: Rash present.   Neurological:      General: No focal deficit present.      Mental Status: She is oriented to person, place, and time.   Psychiatric:         Mood and Affect: Mood normal.         Assessment/Plan   Sergey was seen today for diabetes, hyperlipidemia, hypertension and rosacea.  Diagnoses and all orders for this visit:  Rosacea (Primary)  -     doxycycline (Monodox) 50 mg capsule; Take 1 capsule (50 mg) by mouth once daily. Take with at least 8 ounces (large glass) of water, do not lie down for 30 minutes after  Depression, unspecified depression type  -     desvenlafaxine (Pristiq) 50 mg 24 hr tablet; Take 1 tablet (50 mg) by mouth once daily. Do not crush, chew, or split.  Screening for colon cancer  -     Colonoscopy Screening; Average Risk Patient;  Future  Encounter for screening mammogram for malignant neoplasm of breast  -     BI mammo bilateral screening tomosynthesis; Future  Obstructive sleep apnea  TIA (transient ischemic attack)  Myalgia  Hypertension, unspecified type  Other orders  -     Follow Up In Primary Care - Established; Future    Osteoarthritis may use Tylenol for pain  -Skin rash and blisters resolved with doxycycline  - Need to proceed with screening mammogram  --Needs to proceed with screening colonoscopy order placed today- Obstructive sleep apnea patient awaiting follow-up sleep clinic with Dr. Arroyo for titration studies  Patient seen by sleep clinic Dr. arroyo diagnosis of sleep apnea awaiting titration study and starting CPAP  -Abnormal-brain MRI patient under care of Dr. Santa no significant findings at this time recommendation to follow-up annual MRI for 5 years  -Depression on Pristiq 50 mg needs to continue new medication side effect needs to continue  - TIA, no recurrence now patient off Plavix after 21 days continue with aspirin daily as recommended no recurrent TIAs  -Diabetes continue with metformin 1 g twice a day continue low-carb diet controlled blood sugar record from home reviewed range 100 hemoglobin A1c 6 compensated counseled about weight loss  - Hypercholesterolemia continues atorvastatin  - Hypertension controlled  Follow-up 6 months

## 2024-02-29 DIAGNOSIS — Z12.11 SCREEN FOR COLON CANCER: ICD-10-CM

## 2024-02-29 RX ORDER — SOD SULF/POT CHLORIDE/MAG SULF 1.479 G
TABLET ORAL
Qty: 24 TABLET | Refills: 0 | Status: SHIPPED | OUTPATIENT
Start: 2024-02-29

## 2024-03-03 DIAGNOSIS — E55.9 VITAMIN D DEFICIENCY: ICD-10-CM

## 2024-03-05 RX ORDER — ERGOCALCIFEROL 1.25 MG/1
1 CAPSULE ORAL
Qty: 4 CAPSULE | Refills: 0 | Status: SHIPPED | OUTPATIENT
Start: 2024-03-05 | End: 2024-04-11

## 2024-03-14 DIAGNOSIS — G45.9 TIA (TRANSIENT ISCHEMIC ATTACK): ICD-10-CM

## 2024-03-18 RX ORDER — ATORVASTATIN CALCIUM 80 MG/1
80 TABLET, FILM COATED ORAL DAILY
Qty: 90 TABLET | Refills: 0 | Status: SHIPPED | OUTPATIENT
Start: 2024-03-18

## 2024-03-20 ENCOUNTER — HOSPITAL ENCOUNTER (OUTPATIENT)
Dept: RADIOLOGY | Facility: CLINIC | Age: 49
Discharge: HOME | End: 2024-03-20
Payer: COMMERCIAL

## 2024-03-20 VITALS — BODY MASS INDEX: 45.36 KG/M2 | WEIGHT: 256 LBS | HEIGHT: 63 IN

## 2024-03-20 DIAGNOSIS — Z12.31 ENCOUNTER FOR SCREENING MAMMOGRAM FOR MALIGNANT NEOPLASM OF BREAST: ICD-10-CM

## 2024-03-20 PROCEDURE — 77067 SCR MAMMO BI INCL CAD: CPT | Performed by: RADIOLOGY

## 2024-03-20 PROCEDURE — 77063 BREAST TOMOSYNTHESIS BI: CPT | Performed by: RADIOLOGY

## 2024-03-20 PROCEDURE — 77067 SCR MAMMO BI INCL CAD: CPT

## 2024-03-26 ENCOUNTER — OFFICE VISIT (OUTPATIENT)
Dept: PRIMARY CARE | Facility: CLINIC | Age: 49
End: 2024-03-26
Payer: COMMERCIAL

## 2024-03-26 VITALS
SYSTOLIC BLOOD PRESSURE: 142 MMHG | WEIGHT: 243 LBS | DIASTOLIC BLOOD PRESSURE: 80 MMHG | OXYGEN SATURATION: 97 % | BODY MASS INDEX: 42.71 KG/M2 | TEMPERATURE: 97 F | HEART RATE: 114 BPM

## 2024-03-26 DIAGNOSIS — R29.818 SUSPECTED SLEEP APNEA: ICD-10-CM

## 2024-03-26 DIAGNOSIS — L71.9 ROSACEA: ICD-10-CM

## 2024-03-26 DIAGNOSIS — B00.1 HERPES LABIALIS: Primary | ICD-10-CM

## 2024-03-26 DIAGNOSIS — I10 HYPERTENSION, UNSPECIFIED TYPE: ICD-10-CM

## 2024-03-26 PROCEDURE — 3079F DIAST BP 80-89 MM HG: CPT | Performed by: INTERNAL MEDICINE

## 2024-03-26 PROCEDURE — 99214 OFFICE O/P EST MOD 30 MIN: CPT | Performed by: INTERNAL MEDICINE

## 2024-03-26 PROCEDURE — 4010F ACE/ARB THERAPY RXD/TAKEN: CPT | Performed by: INTERNAL MEDICINE

## 2024-03-26 PROCEDURE — 3077F SYST BP >= 140 MM HG: CPT | Performed by: INTERNAL MEDICINE

## 2024-03-26 PROCEDURE — 3008F BODY MASS INDEX DOCD: CPT | Performed by: INTERNAL MEDICINE

## 2024-03-26 PROCEDURE — 1036F TOBACCO NON-USER: CPT | Performed by: INTERNAL MEDICINE

## 2024-03-26 RX ORDER — VALACYCLOVIR HYDROCHLORIDE 1 G/1
TABLET, FILM COATED ORAL
Qty: 20 TABLET | Refills: 0 | Status: SHIPPED | OUTPATIENT
Start: 2024-03-26 | End: 2024-05-23 | Stop reason: SDUPTHER

## 2024-03-26 ASSESSMENT — ENCOUNTER SYMPTOMS
BLOOD IN STOOL: 0
DIFFICULTY URINATING: 0
UNEXPECTED WEIGHT CHANGE: 0
BRUISES/BLEEDS EASILY: 0
DIARRHEA: 0
HEADACHES: 0
PALPITATIONS: 0
SORE THROAT: 0
DIZZINESS: 0
FEVER: 0
SINUS PAIN: 0
COUGH: 0
ARTHRALGIAS: 0
FATIGUE: 0
WHEEZING: 0
ABDOMINAL PAIN: 0

## 2024-03-26 NOTE — PROGRESS NOTES
Subjective   Patient ID: Sergey South is a 48 y.o. female who presents for Mouth Lesions (Cold sores X 2 days).    Patient comes today rash on the upper lip started by tingling burning now have a blister sometimes gets the blisters under her left eye  Recurrent herpes labialis  Patient to start on Valtrex 2 g twice x 1 day given refills to use it as needed patient sometimes getting recurrent rash monthly around fall or spring season will monitor patient progress closely for further recommendation  - Osteoarthritis may use Tylenol for pain  -Skin rash and blisters resolved with doxycycline  - Need to proceed with screening mammogram  --Needs to proceed with screening colonoscopy order placed today- Obstructive sleep apnea patient awaiting follow-up sleep clinic with Dr. Arroyo for titration studies  Patient seen by sleep clinic Dr. arroyo diagnosis of sleep apnea awaiting titration study and starting CPAP  -Abnormal-brain MRI patient under care of Dr. Santa no significant findings at this time recommendation to follow-up annual MRI for 5 years  -Depression on Pristiq 50 mg needs to continue new medication side effect needs to continue  - TIA, no recurrence now patient off Plavix after 21 days continue with aspirin daily as recommended no recurrent TIAs  -Diabetes continue with metformin 1 g twice a day continue low-carb diet controlled blood sugar record from home reviewed range 100 hemoglobin A1c 6 compensated counseled about weight loss  - Hypercholesterolemia continues atorvastatin  - Hypertension controlled      Mouth Lesions   Associated symptoms include mouth sores. Pertinent negatives include no fever, no abdominal pain, no diarrhea, no congestion, no ear discharge, no ear pain, no headaches, no sore throat, no cough, no wheezing and no rash.          Review of Systems   Constitutional:  Negative for fatigue, fever and unexpected weight change.   HENT:  Positive for mouth sores. Negative for congestion, ear  discharge, ear pain, sinus pain and sore throat.    Eyes:  Negative for visual disturbance.   Respiratory:  Negative for cough and wheezing.    Cardiovascular:  Negative for chest pain, palpitations and leg swelling.   Gastrointestinal:  Negative for abdominal pain, blood in stool and diarrhea.   Genitourinary:  Negative for difficulty urinating.   Musculoskeletal:  Negative for arthralgias.   Skin:  Negative for rash.   Neurological:  Negative for dizziness and headaches.   Hematological:  Does not bruise/bleed easily.   Psychiatric/Behavioral:  Negative for behavioral problems.    All other systems reviewed and are negative.      Objective   Lab Results   Component Value Date    HGBA1C 6.0 09/13/2023      /80   Pulse (!) 114   Temp 36.1 °C (97 °F)   Wt 110 kg (243 lb)   LMP 01/15/2024   SpO2 97%   BMI 42.71 kg/m²     Physical Exam  Vitals and nursing note reviewed.   Constitutional:       Appearance: Normal appearance.   HENT:      Head: Normocephalic.      Nose: Nose normal.   Eyes:      Conjunctiva/sclera: Conjunctivae normal.      Pupils: Pupils are equal, round, and reactive to light.   Cardiovascular:      Rate and Rhythm: Regular rhythm.   Pulmonary:      Effort: Pulmonary effort is normal.      Breath sounds: Normal breath sounds.   Abdominal:      General: Abdomen is flat.      Palpations: Abdomen is soft.   Musculoskeletal:      Cervical back: Neck supple.   Skin:     General: Skin is warm.      Findings: Lesion (Upper lip herpes blister) present. No rash.   Neurological:      General: No focal deficit present.      Mental Status: She is oriented to person, place, and time.   Psychiatric:         Mood and Affect: Mood normal.         Assessment/Plan   Sergey was seen today for mouth lesions.  Diagnoses and all orders for this visit:  Herpes labialis (Primary)  -     valACYclovir (Valtrex) 1 gram tablet; Take 2 tablets twice a day for 1 day.  An early sign of recurrent rash, as  needed  Rosacea  Hypertension, unspecified type   Patient comes today rash on the upper lip started by tingling burning now have a blister sometimes gets the blisters under her left eye  Recurrent herpes labialis  Patient to start on Valtrex 2 g twice x 1 day given refills to use it as needed patient sometimes getting recurrent rash monthly around fall or spring season will monitor patient progress closely for further recommendation  - Osteoarthritis may use Tylenol for pain  -Skin rash and blisters resolved with doxycycline  - Need to proceed with screening mammogram  --Needs to proceed with screening colonoscopy order placed today- Obstructive sleep apnea patient awaiting follow-up sleep clinic with Dr. Arroyo for titration studies  Patient seen by sleep clinic Dr. arroyo diagnosis of sleep apnea awaiting titration study and starting CPAP  -Abnormal-brain MRI patient under care of Dr. Santa no significant findings at this time recommendation to follow-up annual MRI for 5 years  -Depression on Pristiq 50 mg needs to continue new medication side effect needs to continue  - TIA, no recurrence now patient off Plavix after 21 days continue with aspirin daily as recommended no recurrent TIAs  -Diabetes continue with metformin 1 g twice a day continue low-carb diet controlled blood sugar record from home reviewed range 100 hemoglobin A1c 6 compensated counseled about weight loss  - Hypercholesterolemia continues atorvastatin  - Hypertension controlled

## 2024-04-01 DIAGNOSIS — E55.9 VITAMIN D DEFICIENCY: ICD-10-CM

## 2024-04-11 RX ORDER — ERGOCALCIFEROL 1.25 MG/1
1 CAPSULE ORAL
Qty: 4 CAPSULE | Refills: 5 | Status: SHIPPED | OUTPATIENT
Start: 2024-04-14

## 2024-04-12 DIAGNOSIS — E11.65 POORLY CONTROLLED TYPE 2 DIABETES MELLITUS (MULTI): ICD-10-CM

## 2024-04-15 RX ORDER — INSULIN LISPRO 100 [IU]/ML
INJECTION, SUSPENSION SUBCUTANEOUS
Qty: 15 ML | Refills: 0 | Status: SHIPPED | OUTPATIENT
Start: 2024-04-15 | End: 2024-05-22

## 2024-05-03 ENCOUNTER — PHARMACY VISIT (OUTPATIENT)
Dept: PHARMACY | Facility: CLINIC | Age: 49
End: 2024-05-03
Payer: COMMERCIAL

## 2024-05-03 PROCEDURE — RXMED WILLOW AMBULATORY MEDICATION CHARGE

## 2024-05-05 DIAGNOSIS — I10 HYPERTENSION, UNSPECIFIED TYPE: ICD-10-CM

## 2024-05-06 RX ORDER — LOSARTAN POTASSIUM 50 MG/1
50 TABLET ORAL DAILY
Qty: 90 TABLET | Refills: 0 | Status: SHIPPED | OUTPATIENT
Start: 2024-05-06 | End: 2024-05-08

## 2024-05-08 DIAGNOSIS — I10 HYPERTENSION, UNSPECIFIED TYPE: ICD-10-CM

## 2024-05-08 RX ORDER — LOSARTAN POTASSIUM 50 MG/1
50 TABLET ORAL DAILY
Qty: 90 TABLET | Refills: 0 | Status: SHIPPED | OUTPATIENT
Start: 2024-05-08

## 2024-05-10 ENCOUNTER — APPOINTMENT (OUTPATIENT)
Dept: SLEEP MEDICINE | Facility: CLINIC | Age: 49
End: 2024-05-10
Payer: COMMERCIAL

## 2024-05-15 ENCOUNTER — OFFICE VISIT (OUTPATIENT)
Dept: SLEEP MEDICINE | Facility: CLINIC | Age: 49
End: 2024-05-15
Payer: COMMERCIAL

## 2024-05-15 VITALS
BODY MASS INDEX: 42.35 KG/M2 | SYSTOLIC BLOOD PRESSURE: 138 MMHG | OXYGEN SATURATION: 99 % | WEIGHT: 239 LBS | HEIGHT: 63 IN | HEART RATE: 80 BPM | RESPIRATION RATE: 16 BRPM | DIASTOLIC BLOOD PRESSURE: 84 MMHG

## 2024-05-15 DIAGNOSIS — G47.33 OBSTRUCTIVE SLEEP APNEA: Primary | ICD-10-CM

## 2024-05-15 DIAGNOSIS — E83.10 DISORDER OF IRON METABOLISM: ICD-10-CM

## 2024-05-15 DIAGNOSIS — I10 BENIGN ESSENTIAL HYPERTENSION: ICD-10-CM

## 2024-05-15 DIAGNOSIS — G25.81 RLS (RESTLESS LEGS SYNDROME): ICD-10-CM

## 2024-05-15 DIAGNOSIS — E66.01 MORBID OBESITY (MULTI): ICD-10-CM

## 2024-05-15 PROCEDURE — G2211 COMPLEX E/M VISIT ADD ON: HCPCS | Performed by: INTERNAL MEDICINE

## 2024-05-15 PROCEDURE — 3079F DIAST BP 80-89 MM HG: CPT | Performed by: INTERNAL MEDICINE

## 2024-05-15 PROCEDURE — 3075F SYST BP GE 130 - 139MM HG: CPT | Performed by: INTERNAL MEDICINE

## 2024-05-15 PROCEDURE — 3008F BODY MASS INDEX DOCD: CPT | Performed by: INTERNAL MEDICINE

## 2024-05-15 PROCEDURE — 4010F ACE/ARB THERAPY RXD/TAKEN: CPT | Performed by: INTERNAL MEDICINE

## 2024-05-15 PROCEDURE — 99214 OFFICE O/P EST MOD 30 MIN: CPT | Performed by: INTERNAL MEDICINE

## 2024-05-15 PROCEDURE — 1036F TOBACCO NON-USER: CPT | Performed by: INTERNAL MEDICINE

## 2024-05-15 RX ORDER — FERROUS SULFATE 325(65) MG
325 TABLET ORAL
COMMUNITY

## 2024-05-15 ASSESSMENT — SLEEP AND FATIGUE QUESTIONNAIRES
SITING INACTIVE IN A PUBLIC PLACE LIKE A CLASS ROOM OR A MOVIE THEATER: WOULD NEVER DOZE
HOW LIKELY ARE YOU TO NOD OFF OR FALL ASLEEP IN A CAR, WHILE STOPPED FOR A FEW MINUTES IN TRAFFIC: WOULD NEVER DOZE
HOW LIKELY ARE YOU TO NOD OFF OR FALL ASLEEP WHILE LYING DOWN TO REST IN THE AFTERNOON WHEN CIRCUMSTANCES PERMIT: HIGH CHANCE OF DOZING
HOW LIKELY ARE YOU TO NOD OFF OR FALL ASLEEP WHILE WATCHING TV: MODERATE CHANCE OF DOZING
ESS-CHAD TOTAL SCORE: 10
HOW LIKELY ARE YOU TO NOD OFF OR FALL ASLEEP WHILE SITTING AND TALKING TO SOMEONE: WOULD NEVER DOZE
HOW LIKELY ARE YOU TO NOD OFF OR FALL ASLEEP WHILE SITTING AND READING: HIGH CHANCE OF DOZING
HOW LIKELY ARE YOU TO NOD OFF OR FALL ASLEEP WHILE SITTING QUIETLY AFTER LUNCH WITHOUT ALCOHOL: SLIGHT CHANCE OF DOZING
HOW LIKELY ARE YOU TO NOD OFF OR FALL ASLEEP WHEN YOU ARE A PASSENGER IN A CAR FOR AN HOUR WITHOUT A BREAK: SLIGHT CHANCE OF DOZING

## 2024-05-15 ASSESSMENT — PAIN SCALES - GENERAL: PAINLEVEL: 4

## 2024-05-15 ASSESSMENT — PATIENT HEALTH QUESTIONNAIRE - PHQ9
2. FEELING DOWN, DEPRESSED OR HOPELESS: NOT AT ALL
1. LITTLE INTEREST OR PLEASURE IN DOING THINGS: NOT AT ALL
SUM OF ALL RESPONSES TO PHQ9 QUESTIONS 1 AND 2: 0

## 2024-05-15 NOTE — PROGRESS NOTES
[facility] SLEEP MEDICINE CLINIC  FOLLOW-UP VISIT NOTE    PCP: Eliana Knowles MD    HISTORY OF PRESENT ILLNESS     Patient ID: Sergey South is a 48 y.o. female who presents for follow-up  of BRIE and needing new machine .     Patient is here alone today.  To review, patient's medical history is notable for morbid obesity (BMI 42), HTN, seasonal allergies, CNS demyelination, and BRIE.      Interval History  Patient was last seen in 1/26/2024. Plan was to start PAP therapy. Order was sent to [email or web address] ([facility]) as she lives in [place]. However, [facility] was not successful in contacting patient. Apparently, [employer] is out-of-network with patient's insurance; hence, [facility] recommend getting machine thru [facility] or [employer] which were  in-network with patient's insurance.    RLS Follow-up:   RLS symptoms once a week. It does not affect sleep onset nor wakes her up from sleep. Since last visit, ferritin was collected and noted to be low at 54; hence, iron supplementation was started. After iron pills, her aches and pains are much better and she denies RLS symptoms in 2 weeks.     SLEEP STUDY HISTORY (personally reviewed raw data such as interpretation report, data sheet, hypnogram, and titration table if available and applicable)  HST 8/25/2023: AHI3% 69.4, AHI4% 59.1, SpO2 ivania 71.5%, spent 94.6 mins with SpO2<89%   PAP titration 1/7/2024: CPAP was started at 8-12 cm H2O. At CPAP 12 cm H2O with REM non-supine sleep, RDI 1.7, REM RDI 1.7, SpO2 ivania 93%    SLEEP-WAKE SCHEDULE  Bedtime: 2 AM daily  Subjective sleep latency: 30-60 mins  Difficulty falling asleep: yes due to rumination  number of awakenings per night: wakes up every 2 hours to go to bathroom   Nocturia: yes  Falls back asleep right away  Difficulty staying asleep: yes due to nocturia  Final wakeup time: 8 AM daily  Get out of bed time: 8 AM daily  Shift work: no  Naps: once daily for 2 hours   Feels refreshed after a nap: no  Average sleep duration  "(excluding naps): 4-6 hours    SLEEP ENVIRONMENT  Sleep location: bed  Sleep status: sleeps alone during work nights and sleeps with  during naps  Preferred sleep position: Side    SOCIAL HISTORY  Smoking: no  ETOH: 1 drink per month  Marijuana: no  Caffeine: no  Sleep aids: yes on melatonin with Valerian root    WEIGHT: stable    Claustrophobia: Yes     Active Problems, Allergy List, Medication List, and PMH/PSH/FH/Social Hx have been reviewed and reconciled in chart. No significant changes unless documented in the pertinent chart section. Updates made when necessary.     PHYSICAL EXAM     VITAL SIGNS: /84   Pulse 80   Resp 16   Ht 1.607 m (5' 3.25\")   Wt 108 kg (239 lb)   SpO2 99%   BMI 42.00 kg/m²     NECK CIRCUMFERENCE: 17.75 inches    Today ESS: 10  Last visit ESS: 8  NELDA: 19    Physical Exam  Constitutional: Awake, not in distress  Skin: Warm, no rash  Neuro: No tremors, moves all extremities  Psych: alert and oriented to time, place, and person    RESULTS/DATA     Iron (ug/dL)   Date Value   01/17/2024 43   10/16/2023 64     % Saturation (%)   Date Value   01/17/2024 13 (L)   10/16/2023 20 (L)     TIBC (ug/dL)   Date Value   01/17/2024 320   10/16/2023 328     Ferritin (ng/mL)   Date Value   01/17/2024 54   10/16/2023 59       Bicarbonate   Date Value Ref Range Status   05/18/2023 25 21 - 32 mmol/L Final         ASSESSMENT/PLAN     Sergey South is a 48 y.o. female who returns in [facility] for the following problems:    OBSTRUCTIVE SLEEP APNEA, SEVERE (HSAT AHI3% 69.4, AHI4% 59.1 )  SLEEP-RELATED HYPOXEMIA  - Personally reviewed the sleep study's raw data such as interpretation report, data sheet, and hypnogram. Discussed results with patient as well as treatment options. All questions answered. A copy of recent testing was either given to patient or released in [facility]. See HPI for detailed summary of sleep study results.  - Recommend starting auto-CPAP " 8-14 cm H2O with EPR 3, heated humidification, heated tubings, and mask used in sleep lab (Resmed Airfit F20 full face mask). Orders sent to [facility].  - Discussed 30-day mask guarantee and insurance requirement regarding PAP compliance and follow-up.   - Advised about cleaning instructions and replacement schedule of PAP accessories.  - Supportive management as follows: Lose weight. Stay off your back when sleeping. Avoid smoking, alcohol, and sedating medications if applicable. Don't drive when sleepy.     RESTLESS LEG SYNDROME  - 1/7/2024 Ferritin 54, TSAT 13%  - No recurrence of RLS symptoms after starting iron supplementation. Patient states that she is still taking iron pills.  - recheck ferritin and TSAT  - Discussed that RLS is a clinical diagnosis. Discussed triggers of RLS such as caffeine, alcohol, nicotine, medications (antidepressants except Buproprion and Trazodone, 1st generation antihistamines, antipsychotics, anti-emetics except ondansetron), low iron  - Supportive management: Avoid triggers of RLS. Taper off or reduce dose of medications that can cause RLS or switch them to Bupropion if feasible. May take warm bath 2 hours before bedtime. Massage and/or stretch legs while in bed     SEASONAL ALLERGIES   - Start fluticasone nasal spray 2 sprays per nostril once daily 1 hour before bedtime.  - If there is inadequate or lack of improvement on the above intranasal steroid spray, consider adding Azelastine nasal spray, 2 sprays per nostril once daily in the morning.   - Alternatively, may add cetirizine or loratadine 10 mg once daily.     MORBID OBESITY  - Recommend weight loss with diet and exercise.  - Weight loss can help in long term management of BRIE.  - Defer management to PCP.    HYPERTENSION  - BP stable today.  - Continue anti-hypertensive medications per PCP.  - Supportive management: low salt DASH diet (less than 2000 mg sodium intake daily), moderate intensity aerobic exercise at least 30  minutes 5 days per week, reduce stress, quit smoking, limit alcohol, lose weight, and monitor BP once daily  - PCP following. Defer management to PCP.      All of patient's questions were answered. She verbalizes understanding and agreement with my assessment and plan. Refer to after-visit-summary (AVS) for patient education and if applicable, for more details on sleep study preparation, troubleshooting issues with PAP usage, proper cleaning instructions of PAP supplies, and usual recommended replacement schedule for each of the PAP supplies.     Follow-up in 31-90 days after getting new machine.

## 2024-05-16 NOTE — PATIENT INSTRUCTIONS
"Thank you for coming to the Sleep Medicine Clinic today! Your sleep medicine provider today was: Mere Arroyo MD Below is a summary of your treatment plan, patient education, other important information, and our contact numbers.      TREATMENT PLAN     - Start auto-CPAP. Order placed and sent to [facility] [employer].  - Obtain iron studies (need at least 8 hours fasting). If ferritin is less than 75, we will need to start iron supplementation. Otherwise, we will consider starting other medication for RLS.   - Please read the \"Patient Education\" section below for more detailed information. Try implementing tips, reminders, strategies, and supportive management.   - If not yet done, please sign up for [facility] to make a future schedule, send prescription requests, or send messages.    Follow-up Appointment:   Follow-up in 31-90 days after getting new machine.    PATIENT EDUCATION     OBSTRUCTIVE SLEEP APNEA (BRIE) is a sleep disorder where your upper airway muscles relax during sleep and the airway intermittently and repetitively narrows and collapses leading to partially blocked airway (hypopnea) or completely blocked airway (apnea) which, in turn, can disrupt breathing in sleep, lower oxygen levels while you sleep and cause night time wakings. Because both apnea and hypopnea may cause higher carbon dioxide or low oxygen levels, untreated BRIE can lead to heart arrhythmia, elevation of blood pressure, and make it harder for the body to consolidate memory and facilitate metabolism (leading to higher blood sugars at night). Frequent partial arousals occur during sleep resulting in sleep deprivation and daytime sleepiness. BRIE is associated with an increased risk of cardiovascular disease, stroke, hypertension, and insulin resistance. Moreover, untreated BRIE with excessive daytime sleepiness can increase the risk of motor vehicular accidents.    Below are conservative strategies for BRIE regardless of BRIE severity are:   Positional " therapy - Avoid sleeping on your back.   Healthy diet and regular exercise to optimize weight is highly encouraged.   Avoid alcohol late in the evening and sedative-hypnotics as these substances can make sleep apnea worse.   Improve breathing through the nose with intranasal steroid spray, saline rinse, or antihistamines    Safety: Avoid driving vehicle and operating heavy equipment while sleepy. Drowsy driving may lead to life-threatening motor vehicle accidents. A person driving while sleepy is 5 times more likely to have an accident. If you feel sleepy, pull over and take a short power nap (sleep for less than 30 minutes). Otherwise, ask somebody to drive you.    Treatment options for sleep apnea include weight management, positional therapy, Positive Airway Therapy (PAP) therapy, oral appliance therapy, hypoglossal nerve stimulator (Inspire) and select airway surgeries.    Starting Positive Airway Pressure (PAP): You were ordered a device to wear when you sleep called PAP (Positive Airway Pressure) to treat your sleep apnea. The order will be submitted to a durable medical equipment (DME) company who will arrange setting you up with the device. They will provide all the necessary equipment and discuss use and maintenance of the device with you as well as mask fitting and process of replacing / renewing PAP supplies or accessories. Once you get the machine, please start using it immediately. You may not be successful right away and that is okay. Arnel be certain that you keep trying nightly and reach out to DME if you are struggling or having issues with machine usage.     *Please follow-up with me in 1-2 months of starting CPAP to see how well it is working for you and to do some troubleshooting if needed. Also, please bring all PAP equipment with you to follow up appointments unless told otherwise.     Important things to keep in mind as you start PAP:  Insurance will monitor your usage during the first 90  days. You should use your PAP - all night, every night, and including all naps (especially if naps are more than 30 minutes) for your health. The bare minimum is to use your PAP device while sleeping for at least 4 hours per day at least 5-6 days per week.. Otherwise, your PAP device will be reclaimed by your DME company at 90 days.  There are many masks to choose from to wear with your PAP machine. If you are not comfortable with the first mask issued to you, call your DME company and ask for another option to try. You typically have a 30-day mask guarantee from the day you received your machine.   Discuss with your provider if you are having issues breathing with the machine or if the temperature or humidity feel uncomfortable.  Expect to have an adjustment period when you start your device. It helps to continuing wearing the machine every day for a period of time until you get more used to it. You can practice with wearing the mask alone if you need, then add in the PAP air pressure a few days later.   Reach out for help if you are struggling! The sleep medicine department can be reached at [phone]  We encourage you to download data monitoring apps to your phone. For [employer] Air[employer]se 10/11 - MyAir edith. For official.fm - DreamMapper. Both apps are available in the [facility] store for free and are a great tool to monitor your progress with your PAP device night to night.    Tips for success with PAP machine usage:  Comfortable and well-fitting mask  Appropriate pressure on the machine  Using humidification  Support from bed partner and clinical team      Maintaining your CPAP/BPAP device:    The humidification chamber (aka water tank or water chamber) needs to be filled with distilled water to prevent buildup of white deposits in the future. If you cannot find distilled water, you can use tap water but expect to have white deposits buildup seen after prolonged use with tap water. If you start seeing  white deposits on the water chamber, you can clean it by filling it with equal parts of distilled white vinegar and water. Let the vinegar-water mixture sit for 2 hours, and then rinse it with running tap water. Clean with soap and water then let it dry.     You should try to keep your machine clean in order to work well. Here are some tips to clean PAP supplies / accessories:    Clean the humidification chamber (aka water tank) as well as your mask and tubing at least once a week with soap and water.   Alternatively, you can fill a sink or basin with warm water and add a little mild detergent, like Ivory dish soap. Gently wipe your supplies with the soapy water to free all the oils and dirt that may have collected. Once that's done, rinse these items with clean water until the soap is gone and let them air dry. You can hang your tubing over the curtain merlyn in your bathroom so that it dries.  The mask insert (part of the mask that has contact with your skin) needs to be cleaned with soap and water daily. Another option is to wipe them down with CPAP wipes or baby wipes.    You should replace your mask and tubing frequently in order to prevent bacteria buildup, machine damage, and mask seal issues. The older the mask and hose, the high likelihood that there is bacteria buildup in it especially if they are not cleaned regularly. Dirty filters damage machines because build-up of dust and contaminants can cause machine to over-heat, and in time, damage the motor of machine. Cushions lose their seal over time as most masks are made of plastic and silicone while headgear is made of neoprene. These materials will break down with age and frequent use. Here is the recommended replacement schedule for PAP supplies / accessories:    Twice a month- disposable filters and cushions for nasal mask or nasal pillows.  Once a month- cushion for full face mask  Every 3 months- mask with headgear and PAP tubing (standard or heated  hose)  Every 6 months- reusable filter, water chamber, and chin strap     Other useful information:    Some people do not put water in the tank while other people prefers to put water in the tank to prevent mouth dryness. Try to experiment to determine which is more comfortable for you.   In general, new machines have 2 years warranty on parts while health insurance allows you to have a new machine once every 5 years.     Common issues with PAP machine:    Mask gets dislodged when turning to the side: Consider getting a CPAP pillow or switching to a mask with hose on top.     Dry mouth:  Your machine has built-in humidifier that heats up the air to prevent dry mouth. It can be adjusted to your comfort. You can try that first and increase setting one level one night at a time to check which setting is comfortable and effective in lessening dry mouth. In some patients with heated hose, adjusting tube temperature to make air warmer can improve dry mouth. If dry mouth persists despite adjusting humidity or tube temperature setting, may apply OTC Biotene gel over the gums at bedtime.  If Biotene gel is not effective, consider trying XEROSTOM gel from [employer].  Also, eliminate or reduce dose of medications that can cause dry mouth if possible. Lastly, may try getting a separate room humidifier machine.    Airleaks: Please call DME as they may need to adjust your mask or refit you with a different kind or different size of mask. In addition, you can ask DME for tips on getting a good mask seal and mask fit.     Difficulty tolerating the mask: Contact your DME to try a different kind of mask and/or call office to get a referral to Sleep Psychologist for CPAP desensitization. CPAP desensitization technique is a set of strategies that helps patient cope with claustrophobia and anxiety related to wearing mask. Alternatively, we can do a daytime mini-sleep study called PAP-nap trial wherein you will try on different kinds of  "mask and the sleep technician will try different pressure settings on CPAP and BPAP machines to see which specific pressure is tolerable and comfortable for you.     Water droplets or moisture within the hose and/or mask: This is called rain-out and it is caused by condensation of too much heated humidity on the cooler walls of the hose. If you have rain-out, turn down humidity settings or get a heated hose. If you already have a heated hose, turn up the \"tube temperature\" of the heated hose. Alternatively, if you don't want to get a heated hose or warmer air, may wrap the CPAP hose with stockings to keep it somewhat warm. Also, you need to place the machine on the floor and lower the hose so that water won't travel upward towards your mask.     PAP desensitization techniques: If you have concerns about something being on your face at night, you can start by getting used to it before trying to sleep with it as follows:      Sit in a comfortable chair or bed. Connect the mask and hose to the CPAP/BPAP machine. Hold the mask on your face (without straps on) and turn on the machine. Practice breathing with the mask on while awake sitting and watching television, reading, or performing a sedentary activity during the day for 5-10 minutes and then take it off.  If tolerated, try again and gradually build up to longer periods of time. If not tolerated, try and try again until it is more comfortable as you become more desensitized. If you are able to use it for at least 20-30 minutes, move unto the next step.     Sit in a comfortable chair or bed. Connect the mask and hose to the CPAP/BPAP machine. Strap the mask on your head and turn on the machine. Practice breathing with the mask and headgear on while awake sitting and watching television, reading, or performing a sedentary activity. Start with 5-10 minutes and gradually increasing time until you can wear it comfortably for at least 20-30 minutes, then move to the next " step.    Take a shorter daytime nap with machine turned on while you are in a reclined position in bed, sofa, or recliner. Start with 5-10 minute nap and gradually increase up to 30 minutes. It is not important whether you fall asleep or not. The goal is to rest comfortably with PAP machine on.     Reintroduce PAP machine into nighttime sleep. You can begin using it a portion of the night and gradually increase up to entire night.     Proceed from one step to the next only when you are completely comfortable. If you feel any anxiety or discomfort, return to the previous step, then proceed again when comfortable.    Expect to “work” with your CPAP/BIPAP unit. It is important to try to relax when beginning CPAP/BIPAP therapy. Inhalation and exhalation should occur through the nose only. If you are unable to consistently breathe this way, do not panic or lose hope. There are other types of masks which allow you to breathe through your nose and/or your mouth. Also, in some patients, using intranasal steroid spray (e.g. Flonase or Nasocort or Fluticasone) 1 hour before bedtime and/or before putting on CPAP mask can help tolerate breathing through the mask.    Don't give up after a few attempts--some patients adjust quickly, while some patients need 3-4 weeks (or sometimes even longer) to be accustomed to CPAP therapy.  Contact your sleep medicine specialist if you have a significant change in weight since this may affect your pressure.    Restless Legs Syndrome (RLS) is a clinical diagnosis wherein you have the urge to move your legs while sitting or lying down, occurring usually at night or worse at night, and is partially or completely relieved with movement (massage, stretching, walking etc). RLS is usually related with low iron in the brain and gets worse with caffeine, nicotine, alcohol, and certain medications (antidepressants, antipsychotics, sedating antihistamines, metoclopromide, melatonin, coQ10, topiramate,  some antihypertensives, some antibiotics, albuterol, lipitor, levothyroxine)    General care for RLS:  If you are taking medications that triggers or worsens RLS, consider consulting with sleep specialist and/or prescribing doctor about tapering off these medications or switching to a different class of medications if feasible.  Avoid nicotine, alcohol, and caffeine containing substances, such as chocolate and sodas, as these can make symptoms worse.   Try exercise (ie walking) and warm baths 2 hours before bedtime. May also try stretching the legs against the wall prior going to bed, massaging legs while in bed, or doing leg compression.   If RLS symptoms occur 2 to 3 times per week, consider checking lab work to see if certain vitamins or minerals are depleted (i.e. iron levels) and/or start medication for RLS (i.e. ropinirole or pramipexole vs gabapentin).   If you need to take iron pill for RLS:  Take you iron pill on empty stomach either 30-60 minutes before meal or 2 hours after meal.   Take your iron pill with 100-200 mg vitamin C or 1 glass  of orange juice for better absorption.   Do not take your iron pill with milk or dairy products nor with levothyroxine. As much as possible, maintain 2-3 hours difference between milk or levothyroxine intake and iron pill intake.   If iron pill not tolerated, consider iron infusion.    You can also go to the following EDUCATION WEBSITES for further information:   American Academy of Sleep Medicine http://sleepeducation.org  National Sleep Foundation: https://sleepfoundation.org  American Sleep Apnea Association: https://www.sleepapnea.org (for patients with sleep apnea)  Narcolepsy Network: https://www.narcolepsynetwork.org (for patients with narcolepsy)  [employer]ole[employer]: https://www.wakeupnarcolepsy.org (for patients with narcolepsy)  Hypersomnia Foundation: https://www.hypersomniafoundation.org (for patients with idiopathic hypersomnia)  RLS foundation:  https://www.rls.org (for patients with restless leg syndrome)    IMPORTANT INFORMATION     Call 911 for medical emergencies.  Our offices are generally open from Monday-Friday, 8 am - 5 pm.   There are no supporting services by either the sleep doctors or their staff on weekends and Holidays, or after 5 PM on weekdays.   If you need to get in touch with me, you may either call my office number or you can use WordSentry.  If a referral for a test, for CPAP, or for another specialist was made, and you have not heard about scheduling this within a week, please call scheduling at [phone] ([phone]).  If you are unable to make your appointment for clinic or an overnight study, kindly call the office or sleep testing center at least 48 hours in advance to cancel and reschedule.  If you are on CPAP, please bring your device's card and/or the device to each clinic appointment.   In case of problems with PAP machine or mask interface, please contact your [employer] (Durable Medical Equipment) company first. [employer] is the company who provides you the machine and/or PAP supplies.       PRESCRIPTIONS     We require 7 days advanced notice for prescription refills. If we do not receive the request in this time, we cannot guarantee that your medication will be refilled in time.    IMPORTANT PHONE NUMBERS     Sleep Medicine Clinic Fax: [phone]  Appointments (for Pediatric Sleep Clinic): [phone] - option 1  Appointments (for Adult Sleep Clinic): [phone] - option 2  Appointments (For Sleep Studies): [phone] - option 3  Behavioral Sleep Medicine: [phone]  Sleep Surgery: [phone]  Nutrition Service: [phone]  Weight management clinics with endocrinology: [phone]  Bariatric Services: [phone] (includes weight loss medications and weight loss surgery)  [facility]: [phone] (offers holistic approaches to weight management)  ENT (Otolaryngology): [phone]  Headache  Clinic (Neurology): [phone]  Neurology: [phone]  Psychiatry: [phone]  Pulmonary Function Testing (PFT) Center: [phone]  Pulmonary Medicine: [phone]  Medical Service AppMyDay ([employer]): [phone]      OUR SLEEP TESTING LOCATIONS     Our team will contact you to schedule your sleep study, however, you can contact us as follow:  Main Phone Line (scheduling only): [phone]), option 3  Adult and Pediatric Locations  [facility] (6 years and older): [facility] [place] [facility] - 4th floor ([address]) After hours line: [phone]  [facility] (Main campus: All ages): [facility], 6th floor. After hours line: [phone]   [place] (5 years and older; younger considered on case-by-case basis): [address]; [address], [address]. Scheduling  After hours line: [phone]   [place] (6 years and older): [address]; [address]; [address]   [place] (6 years and older): [address] [facility] [address], [address]  After hours line: [phone]   [place] (13 years and older) in [place]: [address], 2nd floor  After hours line: [phone]  [facility] (13 year and older): [address]  After hours line: [phone]     Adult Only Locations:   [place] (18 years and older): [address] [facility], 2nd floor   [place] (18 years and older): [address] [facility]; 4th floor  After hours line: [phone]  [facility] (18 years and older) at [place]: [address] [facility]  After hours line: [phone]     CONTACTING YOUR SLEEP MEDICINE PROVIDER AND SLEEP TEAM      For issues with your machine or mask interface, please call your DME provider first. DME stands for durable medical company. DME is the company who provides you the machine and/or PAP supplies / accessories.   To schedule, cancel, or reschedule SLEEP STUDY APPOINTMENTS, please call the Main Phone Line at  "[phone] - option 3.   To schedule, cancel, or reschedule CLINIC APPOINTMENTS, you can do it in \"MyChart\", call [phone] (direct line) to speak with my practice lead (Yesi Davis), or call the Main Phone Line at [phone] - option 2  For CLINICAL QUESTIONS or MEDICATION REFILLS, please call direct line for Adult Sleep Nurses at [phone].   Lastly, you can also send a message directly to your provider through \"My Chart\", which is the email service through your  Records Account: [email or web address]       Here at [facility], we wish you a restful sleep!    "

## 2024-05-21 DIAGNOSIS — E11.65 POORLY CONTROLLED TYPE 2 DIABETES MELLITUS (MULTI): ICD-10-CM

## 2024-05-22 RX ORDER — INSULIN LISPRO 100 [IU]/ML
INJECTION, SUSPENSION SUBCUTANEOUS
Qty: 15 ML | Refills: 1 | Status: SHIPPED | OUTPATIENT
Start: 2024-05-22 | End: 2024-05-23 | Stop reason: SDUPTHER

## 2024-05-23 ENCOUNTER — TELEPHONE (OUTPATIENT)
Dept: NEUROLOGY | Facility: HOSPITAL | Age: 49
End: 2024-05-23
Payer: COMMERCIAL

## 2024-05-23 DIAGNOSIS — E11.65 POORLY CONTROLLED TYPE 2 DIABETES MELLITUS (MULTI): ICD-10-CM

## 2024-05-23 DIAGNOSIS — B00.1 HERPES LABIALIS: ICD-10-CM

## 2024-05-23 DIAGNOSIS — G37.9 CNS DEMYELINATION (MULTI): Primary | ICD-10-CM

## 2024-05-23 DIAGNOSIS — E11.65 UNCONTROLLED TYPE 2 DIABETES MELLITUS WITH HYPERGLYCEMIA (MULTI): ICD-10-CM

## 2024-05-23 RX ORDER — INSULIN LISPRO 100 [IU]/ML
INJECTION, SUSPENSION SUBCUTANEOUS
Qty: 15 ML | Refills: 1 | Status: SHIPPED | OUTPATIENT
Start: 2024-05-23

## 2024-05-23 RX ORDER — VALACYCLOVIR HYDROCHLORIDE 1 G/1
TABLET, FILM COATED ORAL
Qty: 20 TABLET | Refills: 0 | Status: SHIPPED | OUTPATIENT
Start: 2024-05-23

## 2024-05-23 RX ORDER — METFORMIN HYDROCHLORIDE 1000 MG/1
1000 TABLET ORAL
Qty: 180 TABLET | Refills: 3 | Status: SHIPPED | OUTPATIENT
Start: 2024-05-23 | End: 2025-05-23

## 2024-05-23 NOTE — TELEPHONE ENCOUNTER
Sergey [place] called. Please reorder her C-spine MRI with and without contrast and Brain MRI with & without contrast. The order didn't cross over to [facility]. She would like to date to be for June 2024. Please let me know when this is done and I will call her to schedule

## 2024-06-03 ENCOUNTER — PRE-ADMISSION TESTING (OUTPATIENT)
Dept: PREADMISSION TESTING | Facility: HOSPITAL | Age: 49
End: 2024-06-03
Payer: COMMERCIAL

## 2024-06-03 ENCOUNTER — ANESTHESIA EVENT (OUTPATIENT)
Dept: ANESTHESIOLOGY | Facility: HOSPITAL | Age: 49
End: 2024-06-03

## 2024-06-03 VITALS — WEIGHT: 238 LBS | BODY MASS INDEX: 41.83 KG/M2

## 2024-06-03 PROBLEM — E78.5 DYSLIPIDEMIA: Status: ACTIVE | Noted: 2024-06-03

## 2024-06-03 ASSESSMENT — DUKE ACTIVITY SCORE INDEX (DASI)
CAN YOU RUN A SHORT DISTANCE: YES
CAN YOU CLIMB A FLIGHT OF STAIRS OR WALK UP A HILL: YES
CAN YOU HAVE SEXUAL RELATIONS: YES
CAN YOU DO YARD WORK LIKE RAKING LEAVES, WEEDING OR PUSHING A MOWER: YES
CAN YOU WALK INDOORS, SUCH AS AROUND YOUR HOUSE: YES
CAN YOU PARTICIPATE IN STRENOUS SPORTS LIKE SWIMMING, SINGLES TENNIS, FOOTBALL, BASKETBALL, OR SKIING: NO
CAN YOU WALK A BLOCK OR TWO ON LEVEL GROUND: YES
CAN YOU PARTICIPATE IN MODERATE RECREATIONAL ACTIVITIES LIKE GOLF, BOWLING, DANCING, DOUBLES TENNIS OR THROWING A BASEBALL OR FOOTBALL: YES
CAN YOU TAKE CARE OF YOURSELF (EAT, DRESS, BATHE, OR USE TOILET): YES
CAN YOU DO HEAVY WORK AROUND THE HOUSE LIKE SCRUBBING FLOORS OR LIFTING AND MOVING HEAVY FURNITURE: YES
CAN YOU DO LIGHT WORK AROUND THE HOUSE LIKE DUSTING OR WASHING DISHES: YES

## 2024-06-03 NOTE — PREPROCEDURE INSTRUCTIONS
Medication List            Accurate as of Lela 3, 2024  9:22 AM. Always use your most recent med list.                aspirin 81 mg EC tablet  Medication Adjustments for Surgery: Continue until night before surgery     atorvastatin 80 mg tablet  Commonly known as: Lipitor  Take 1 tablet by mouth once daily  Medication Adjustments for Surgery: Continue until night before surgery     desvenlafaxine 50 mg 24 hr tablet  Commonly known as: Pristiq  Take 1 tablet (50 mg) by mouth once daily. Do not crush, chew, or split.  Medication Adjustments for Surgery: Continue until night before surgery     ergocalciferol 1.25 MG (89306 UT) capsule  Commonly known as: Vitamin D-2  Take 1 capsule by mouth once a week  Medication Adjustments for Surgery: Stop 7 days before surgery     ferrous sulfate (325 mg ferrous sulfate) tablet  Medication Adjustments for Surgery: Stop 7 days before surgery     insulin lispro protamin-lispro 100 unit/mL (75-25) injection  Commonly known as: HumaLOG Mix 75-25 KwikPen  INJECT 20 UNITS SUBCUTANEOUSLY TWICE DAILY BEFORE BREAKFAST AND DINNER  Medication Adjustments for Surgery: Continue until night before surgery  Notes to patient: Can cut evening dose in half     losartan 50 mg tablet  Commonly known as: Cozaar  Take 1 tablet by mouth once daily  Medication Adjustments for Surgery: Continue until night before surgery     metFORMIN 1,000 mg tablet  Commonly known as: Glucophage  Take 1 tablet (1,000 mg) by mouth 2 times daily (morning and late afternoon).  Medication Adjustments for Surgery: Continue until night before surgery     OneTouch Verio test strips strip  Generic drug: blood sugar diagnostic     Sutab 1.479-0.188- 0.225 gram tablet  Generic drug: sod sulf-pot chloride-mag sulf  One kit   At 6pm  the night before take 12 pills in 20 min. And at 3.am the day of procedure take 12 pills in 20 min.  Medication Adjustments for Surgery: Take morning of surgery with sip of water, no other fluids      valACYclovir 1 gram tablet  Commonly known as: Valtrex  Take 2 tablets twice a day for 1 day.  An early sign of recurrent rash, as needed  Medication Adjustments for Surgery: Continue until night before surgery     vitamin D3-vitamin K2 125-90 mcg capsule  Medication Adjustments for Surgery: Stop 7 days before surgery                        NPO Instructions:     Follow instructions. Day before procedure-may have light breakfast by 9am (ex. 1 egg, 1 piece of toast).  Then CLEAR LIQUIDS ONLY-No dairy products, nothing red/purple.  Take first part of prep- 6pm  Drink lots of liquids.  Day of Procedure- 3-4am Take Second Part of Prep.   STOP DRINKING 3 HOURS PRIOR TO PROCEDURE.  Take medications as instructed.       Additional Instructions:      Review your medication instructions, take indicated medications  Wear  comfortable loose fitting clothing  All jewelry and valuables should be left at home     Park in back of hospital by ER. Come up to Second floor-Outpt dept to check in.  Bring Photo ID and Insurance card,   You MUST have a  with you.       If you get ill at all before your procedure- CALL YOUR DOCTOR/SURGEON.  We want you in the best shape that is possible. Any sickness might lead to your procedure being delayed.       Call Outpatient dept at [phone] the night before your procedure (Friday for Monday procedure), between 1-3 pm.

## 2024-06-03 NOTE — ANESTHESIA PREPROCEDURE EVALUATION
Patient: Sergey South    Procedure Information    Date: 06/03/24  Reason: PAT         Relevant Problems   Anesthesia (within normal limits)      Cardiac   (+) HTN (hypertension)      Pulmonary   (+) Obstructive sleep apnea      Neuro   (+) Anxiety   (+) Depression   (+) TIA (transient ischemic attack)      GI (within normal limits)      /Renal (within normal limits)      Liver (within normal limits)      Endocrine   (+) Morbid obesity with BMI of 40.0-44.9, adult (Multi)   (+) Poorly controlled type 2 diabetes mellitus (Multi)      Hematology (within normal limits)      Musculoskeletal (within normal limits)      HEENT   (+) Seasonal allergies      ID (within normal limits)      Skin (within normal limits)      GYN (within normal limits)      Nervous   (+) RLS (restless legs syndrome)      Cardiac and Vasculature   (+) Dyslipidemia       Clinical information reviewed:                   Chart reviewed.  No clearances ordered.    Echo 4/24/23-  CONCLUSIONS:  1. Left ventricular systolic function is normal with a 55-60% estimated ejection fraction.  2. Spectral Doppler shows an impaired relaxation pattern of left ventricular diastolic filling.       There were no vitals filed for this visit.    Past Surgical History:   Procedure Laterality Date    TUMOR REMOVAL       Past Medical History:   Diagnosis Date    Diabetes mellitus (Multi)     Dyslipidemia 6/3/2024    Hypertension        Current Outpatient Medications:     aspirin 81 mg EC tablet, Take 1 tablet (81 mg) by mouth once daily., Disp: , Rfl:     atorvastatin (Lipitor) 80 mg tablet, Take 1 tablet by mouth once daily, Disp: 90 tablet, Rfl: 0    blood sugar diagnostic (OneTouch Verio test strips) strip, TEST 3 TIMES DAILY., Disp: , Rfl:     cholecalciferol, vitD3,/vit K2 (vitamin D3-vitamin K2) 125-90 mcg capsule, Take by mouth., Disp: , Rfl:     desvenlafaxine (Pristiq) 50 mg 24 hr tablet, Take 1 tablet (50 mg) by mouth once daily. Do not crush, chew, or  split., Disp: 30 tablet, Rfl: 11    ergocalciferol (Vitamin D-2) 1.25 MG (22001 UT) capsule, Take 1 capsule by mouth once a week, Disp: 4 capsule, Rfl: 5    ferrous sulfate, 325 mg ferrous sulfate, tablet, Take 1 tablet by mouth once daily with breakfast., Disp: , Rfl:     insulin lispro protamin-lispro (HumaLOG Mix 75-25 KwikPen) 100 unit/mL (75-25) injection, INJECT 20 UNITS SUBCUTANEOUSLY TWICE DAILY BEFORE BREAKFAST AND DINNER, Disp: 15 mL, Rfl: 1    losartan (Cozaar) 50 mg tablet, Take 1 tablet by mouth once daily, Disp: 90 tablet, Rfl: 0    metFORMIN (Glucophage) 1,000 mg tablet, Take 1 tablet (1,000 mg) by mouth 2 times daily (morning and late afternoon)., Disp: 180 tablet, Rfl: 3    Sutab 1.479-0.188- 0.225 gram tablet, One kit  At 6pm  the night before take 12 pills in 20 min. And at 3.am the day of procedure take 12 pills in 20 min. (Patient not taking: Reported on 5/15/2024), Disp: 24 tablet, Rfl: 0    valACYclovir (Valtrex) 1 gram tablet, Take 2 tablets twice a day for 1 day.  An early sign of recurrent rash, as needed, Disp: 20 tablet, Rfl: 0  Prior to Admission medications    Medication Sig Start Date End Date Taking? Authorizing Provider   aspirin 81 mg EC tablet Take 1 tablet (81 mg) by mouth once daily.    Historical Provider, MD   atorvastatin (Lipitor) 80 mg tablet Take 1 tablet by mouth once daily 3/18/24   Eliana Knowles MD   blood sugar diagnostic (OneTouch Verio test strips) strip TEST 3 TIMES DAILY. 4/25/23   Historical Provider, MD   cholecalciferol, vitD3,/vit K2 (vitamin D3-vitamin K2) 125-90 mcg capsule Take by mouth.    Historical Provider, MD   desvenlafaxine (Pristiq) 50 mg 24 hr tablet Take 1 tablet (50 mg) by mouth once daily. Do not crush, chew, or split. 2/19/24 2/18/25  Eliana Knowles MD   ergocalciferol (Vitamin D-2) 1.25 MG (02371 UT) capsule Take 1 capsule by mouth once a week 4/14/24   Génesis Altman, APRN-CNP   ferrous sulfate, 325 mg ferrous sulfate, tablet Take 1  tablet by mouth once daily with breakfast.    Historical Provider, MD   insulin lispro protamin-lispro (HumaLOG Mix 75-25 KwikPen) 100 unit/mL (75-25) injection INJECT 20 UNITS SUBCUTANEOUSLY TWICE DAILY BEFORE BREAKFAST AND DINNER 5/23/24   Eliana Knowles MD   losartan (Cozaar) 50 mg tablet Take 1 tablet by mouth once daily 5/8/24   Eliana Knowles MD   metFORMIN (Glucophage) 1,000 mg tablet Take 1 tablet (1,000 mg) by mouth 2 times daily (morning and late afternoon). 5/23/24 5/23/25  Eliana Knowles MD   Sutab 1.479-0.188- 0.225 gram tablet One kit   At 6pm  the night before take 12 pills in 20 min. And at 3.am the day of procedure take 12 pills in 20 min.  Patient not taking: Reported on 5/15/2024 2/29/24   Prudencio Maldonado MD   valACYclovir (Valtrex) 1 gram tablet Take 2 tablets twice a day for 1 day.  An early sign of recurrent rash, as needed 5/23/24   Eliana Knowles MD     No Known Allergies  Social History     Tobacco Use    Smoking status: Never    Smokeless tobacco: Never   Substance Use Topics    Alcohol use: Yes     Comment: 3-6 per year         Chemistry    Lab Results   Component Value Date/Time     05/18/2023 1800    K 4.4 05/18/2023 1800     05/18/2023 1800    CO2 25 05/18/2023 1800    BUN 15 05/18/2023 1800    CREATININE 0.67 05/18/2023 1800    Lab Results   Component Value Date/Time    CALCIUM 9.7 05/18/2023 1800    ALKPHOS 98 05/18/2023 1800    AST 18 05/18/2023 1800    ALT 23 05/18/2023 1800    BILITOT 0.3 05/18/2023 1800          Lab Results   Component Value Date/Time    WBC 13.2 (H) 05/18/2023 1800    HGB 14.3 05/18/2023 1800    HCT 44.0 05/18/2023 1800     05/18/2023 1800     Lab Results   Component Value Date/Time    PROTIME CANCELED 04/24/2023 0639    INR CANCELED 04/24/2023 0639     No results found for this or any previous visit (from the past 4464 hour(s)).  No results found for this or any previous visit from the past 1095 days.      NPO Detail:  No data  recorded     PHYSICAL EXAM    Anesthesia Plan

## 2024-06-17 DIAGNOSIS — G45.9 TIA (TRANSIENT ISCHEMIC ATTACK): ICD-10-CM

## 2024-06-17 RX ORDER — ATORVASTATIN CALCIUM 80 MG/1
80 TABLET, FILM COATED ORAL DAILY
Qty: 90 TABLET | Refills: 0 | Status: SHIPPED | OUTPATIENT
Start: 2024-06-17

## 2024-06-24 ENCOUNTER — ANESTHESIA (OUTPATIENT)
Dept: GASTROENTEROLOGY | Facility: HOSPITAL | Age: 49
End: 2024-06-24
Payer: COMMERCIAL

## 2024-06-24 ENCOUNTER — ANESTHESIA EVENT (OUTPATIENT)
Dept: GASTROENTEROLOGY | Facility: HOSPITAL | Age: 49
End: 2024-06-24
Payer: COMMERCIAL

## 2024-06-24 ENCOUNTER — HOSPITAL ENCOUNTER (OUTPATIENT)
Dept: GASTROENTEROLOGY | Facility: HOSPITAL | Age: 49
Setting detail: OUTPATIENT SURGERY
Discharge: HOME | End: 2024-06-24
Payer: COMMERCIAL

## 2024-06-24 VITALS
RESPIRATION RATE: 16 BRPM | WEIGHT: 238 LBS | BODY MASS INDEX: 40.63 KG/M2 | HEART RATE: 81 BPM | DIASTOLIC BLOOD PRESSURE: 82 MMHG | TEMPERATURE: 97.3 F | OXYGEN SATURATION: 98 % | SYSTOLIC BLOOD PRESSURE: 138 MMHG | HEIGHT: 64 IN

## 2024-06-24 DIAGNOSIS — Z12.11 SCREENING FOR COLON CANCER: ICD-10-CM

## 2024-06-24 LAB — GLUCOSE BLD MANUAL STRIP-MCNC: 103 MG/DL (ref 74–99)

## 2024-06-24 PROCEDURE — 7100000009 HC PHASE TWO TIME - INITIAL BASE CHARGE

## 2024-06-24 PROCEDURE — 2500000004 HC RX 250 GENERAL PHARMACY W/ HCPCS (ALT 636 FOR OP/ED): Performed by: NURSE ANESTHETIST, CERTIFIED REGISTERED

## 2024-06-24 PROCEDURE — 7100000010 HC PHASE TWO TIME - EACH INCREMENTAL 1 MINUTE

## 2024-06-24 PROCEDURE — 2500000004 HC RX 250 GENERAL PHARMACY W/ HCPCS (ALT 636 FOR OP/ED): Performed by: PHYSICIAN ASSISTANT

## 2024-06-24 PROCEDURE — 82947 ASSAY GLUCOSE BLOOD QUANT: CPT

## 2024-06-24 PROCEDURE — 45380 COLONOSCOPY AND BIOPSY: CPT | Performed by: SURGERY

## 2024-06-24 PROCEDURE — 3700000002 HC GENERAL ANESTHESIA TIME - EACH INCREMENTAL 1 MINUTE

## 2024-06-24 PROCEDURE — 3700000001 HC GENERAL ANESTHESIA TIME - INITIAL BASE CHARGE

## 2024-06-24 RX ORDER — SODIUM CHLORIDE, SODIUM LACTATE, POTASSIUM CHLORIDE, CALCIUM CHLORIDE 600; 310; 30; 20 MG/100ML; MG/100ML; MG/100ML; MG/100ML
100 INJECTION, SOLUTION INTRAVENOUS CONTINUOUS
Status: DISCONTINUED | OUTPATIENT
Start: 2024-06-24 | End: 2024-06-25 | Stop reason: HOSPADM

## 2024-06-24 RX ORDER — ONDANSETRON HYDROCHLORIDE 2 MG/ML
4 INJECTION, SOLUTION INTRAVENOUS ONCE AS NEEDED
Status: DISCONTINUED | OUTPATIENT
Start: 2024-06-24 | End: 2024-06-25 | Stop reason: HOSPADM

## 2024-06-24 RX ORDER — PROPOFOL 10 MG/ML
INJECTION, EMULSION INTRAVENOUS AS NEEDED
Status: DISCONTINUED | OUTPATIENT
Start: 2024-06-24 | End: 2024-06-24

## 2024-06-24 SDOH — HEALTH STABILITY: MENTAL HEALTH: CURRENT SMOKER: 0

## 2024-06-24 ASSESSMENT — PAIN SCALES - GENERAL
PAINLEVEL_OUTOF10: 0 - NO PAIN
PAIN_LEVEL: 0
PAINLEVEL_OUTOF10: 0 - NO PAIN
PAINLEVEL_OUTOF10: 4
PAINLEVEL_OUTOF10: 0 - NO PAIN
PAINLEVEL_OUTOF10: 0 - NO PAIN

## 2024-06-24 ASSESSMENT — PAIN - FUNCTIONAL ASSESSMENT
PAIN_FUNCTIONAL_ASSESSMENT: 0-10

## 2024-06-24 NOTE — LETTER
"July 9, 2024     Sergey JUAN Adlof  [address] [facility] [address]      Dear Ms. South:    Below are the results from your recent visit:    Resulted Orders   Surgical Pathology Exam   Result Value Ref Range    Case Report       Surgical Pathology                                Case: [id number]                                  Authorizing Provider:  Prudencio Maldonado MD         Collected:           06/24/2024 1004              Ordering Location:     [facility]   Received:            06/24/2024 1230              Pathologist:           Ezio Vaz MD                                                           Specimen:    COLON - CECUM BIOPSY, via cold bx                                                          FINAL DIAGNOSIS       A. CECUM, BIOPSY:   Colonic mucosa with prominent lymphoid aggregates in the lamina propria and submucosa.    Note: No neoplasia is identified.                By the signature on this report, the individual or group listed as making the Final Interpretation/Diagnosis certifies that they have reviewed this case.       Gross Description       A: Received in formalin, labeled with the patient's name and hospital number and \"cecum BX\", are 2 fragments  of tan, soft tissue aggregating to 1.0 x 0.3 x 0.3 cm. The specimen is submitted in toto in one cassette.  DMB       POCT GLUCOSE   Result Value Ref Range    POCT Glucose 103 (H) 74 - 99 mg/dL     The test results show that your current treatment is working. Please review the attached information. We recommend that you repeat the above test(s) in 5 years. Pathology for colonoscopy is normal     If you have any questions or concerns, please don't hesitate to call.         Sincerely,        Prudencio Maldonado MD  "

## 2024-06-24 NOTE — ANESTHESIA POSTPROCEDURE EVALUATION
Patient: Sergey South    Procedure Summary       Date: 06/24/24 Room / Location: [facility]    Anesthesia Start: 0951 Anesthesia Stop: 1017    Procedure: COLONOSCOPY Diagnosis: Screening for colon cancer    Scheduled Providers: Prudencio Maldonado MD Responsible Provider: CAL Marti    Anesthesia Type: MAC ASA Status: 3            Anesthesia Type: MAC    Vitals Value Taken Time   /65 06/24/24 1016   Temp 36.3 °C (97.3 °F) 06/24/24 1016   Pulse 85 06/24/24 1016   Resp 17 06/24/24 1016   SpO2 97 % 06/24/24 1016       Anesthesia Post Evaluation    Patient location during evaluation: PACU  Patient participation: complete - patient participated  Level of consciousness: awake  Pain score: 0  Pain management: adequate  Airway patency: patent  Cardiovascular status: acceptable  Respiratory status: acceptable  Hydration status: acceptable  Postoperative Nausea and Vomiting: none        No notable events documented.

## 2024-06-24 NOTE — DISCHARGE INSTRUCTIONS
Patient Instructions after a Colonoscopy      The anesthetics, sedatives or narcotics which were given to you today will be acting in your body for the next 24 hours, so you might feel a little sleepy or groggy.  This feeling should slowly wear off. Carefully read and follow the instructions.     You received sedation today:  - Do not drive or operate any machinery or power tools of any kind.   - No alcoholic beverages today, not even beer or wine.  - Do not make any important decisions or sign any legal documents.  - No over the counter medications that contain alcohol or that may cause drowsiness.  - Do not make any important decisions or sign any legal documents.  - Make sure you have someone with you for first 24 hours.    While it is common to experience mild to moderate abdominal distention, gas, or belching after your procedure, if any of these symptoms occur following discharge from the GI Lab or within one week of having your procedure, call the [facility] to be advised whether a visit to your nearest Urgent Care or Emergency Department is indicated.  Take this paper with you if you go.     - If you develop an allergic reaction to the medications that were given during your procedure such as difficulty breathing, rash, hives, severe nausea, vomiting or lightheadedness.  - If you experience chest pain, shortness of breath, severe abdominal pain, fevers and chills.  -If you develop signs and symptoms of bleeding such as blood in your spit, if your stools turn black, tarry, or bloody  - If you have not urinated within 8 hours following your procedure.  - If your IV site becomes painful, red, inflamed, or looks infected.    If you received a biopsy/polypectomy/sphincterotomy the following instructions apply below:    __ Do not use Aspirin containing products, non-steroidal medications or anti-coagulants for one week following your procedure. (Examples of these types of medications are: Advil,  Arthrotec, Aleve, Coumadin, Ecotrin, Heparin, Ibuprofen, Indocin, Motrin, Naprosyn, Nuprin, Plavix, Vioxx, and Voltarin, or their generic forms.  This list is not all-inclusive.  Check with your physician or pharmacist before resuming medications.)   __ Eat a soft diet today.  Avoid foods that are poorly digested for the next 24 hours.  These foods would include: nuts, beans, lettuce, red meats, and fried foods. Start with liquids and advance your diet as tolerated, gradually work up to eating solids.   __ Do not have a Barium Study or Enema for one week.    Your physician recommends the additional following instructions:    -You have a contact number available for emergencies. The signs and symptoms of potential delayed complications were discussed with you. You may return to normal activities tomorrow.  -Resume your previous diet.  -Continue your present medications.   -We are waiting for your pathology results.  -Your physician has recommended a repeat colonoscopy (date to be determined after pending pathology results are reviewed) for surveillance based on pathology results.  -The findings and recommendations have been discussed with you.  -The findings and recommendations were discussed with your family.  - Please see Medication Reconciliation Form for new medication/medications prescribed.       If you experience any problems or have any questions following discharge from the GI Lab, please call:        Nurse Signature                                                                        Date___________________                                                                            Patient/Responsible Party Signature                                        Date___________________

## 2024-06-24 NOTE — ANESTHESIA PREPROCEDURE EVALUATION
Patient: Sergey South    Procedure Information       Date/Time: 06/24/24 0930    Scheduled providers: Prudencio Maldonado MD    Procedure: COLONOSCOPY    Location: [facility]            Relevant Problems   Cardiac   (+) HTN (hypertension)      Pulmonary   (+) Obstructive sleep apnea      Neuro   (+) Anxiety   (+) Depression      Endocrine   (+) Poorly controlled type 2 diabetes mellitus (Multi)      HEENT   (+) Seasonal allergies       Clinical information reviewed:   Tobacco  Allergies  Meds   Med Hx  Surg Hx  OB Status  Fam Hx  Soc   Hx        NPO Detail:  NPO/Void Status  Carbohydrate Drink Given Prior to Surgery? : N  Date of Last Liquid: 06/24/24  Time of Last Liquid: 0600  Date of Last Solid: 06/23/24  Time of Last Solid: 1100  Last Intake Type: Clear fluids  Time of Last Void: 0730         Physical Exam    Airway  Mallampati: II  TM distance: >3 FB     Cardiovascular - normal exam     Dental - normal exam     Pulmonary - normal exam     Abdominal - normal exam             Anesthesia Plan    History of general anesthesia?: yes  History of complications of general anesthesia?: no    ASA 3     MAC     The patient is not a current smoker.  Patient was previously instructed to abstain from smoking on day of procedure.  Patient did not smoke on day of procedure.    intravenous induction   Anesthetic plan and risks discussed with patient.

## 2024-06-24 NOTE — H&P
"History Of Present Illness  Sergey South is a 48 y.o. female presenting for a high risk colonoscopy      Past Medical History  Past Medical History:   Diagnosis Date    Diabetes mellitus (Multi)     Dyslipidemia 06/03/2024    Hypertension     BRIE (obstructive sleep apnea)     TIA (transient ischemic attack)        Surgical History  Past Surgical History:   Procedure Laterality Date    TUMOR REMOVAL Right     benign axillary        Social History  She reports that she has never smoked. She has never used smokeless tobacco. She reports current alcohol use. She reports that she does not use drugs.    Family History  Family History   Problem Relation Name Age of Onset    Hypertension Mother      Cancer Mother          Allergies  Patient has no known allergies.    Review of Systems   All other systems reviewed and are negative.       Physical Exam  Constitutional:       Appearance: Normal appearance.   Cardiovascular:      Heart sounds: Normal heart sounds.   Pulmonary:      Breath sounds: Normal breath sounds and air entry.   Abdominal:      General: Abdomen is flat.      Palpations: Abdomen is soft.      Tenderness: There is no abdominal tenderness.   Neurological:      Mental Status: She is alert.          Last Recorded Vitals  Blood pressure 146/77, pulse 72, temperature 35.9 °C (96.6 °F), temperature source Temporal, resp. rate 17, height 1.613 m (5' 3.5\"), weight 108 kg (238 lb), SpO2 97%.    COLONOSCOPY/BIOPSIES.  Risks include, but not limited to pain, infection, bleeding, perforation, missed lesions, aspiration, risk of cardiac, pulmonary, neurologic, locomotor, anesthetic events, incomplete colonoscopy, and other unforeseen complications including death      Prudencio Maldonado MD    "

## 2024-07-01 ENCOUNTER — APPOINTMENT (OUTPATIENT)
Dept: RHEUMATOLOGY | Facility: CLINIC | Age: 49
End: 2024-07-01
Payer: COMMERCIAL

## 2024-07-01 VITALS
HEIGHT: 64 IN | SYSTOLIC BLOOD PRESSURE: 129 MMHG | WEIGHT: 239.6 LBS | BODY MASS INDEX: 40.9 KG/M2 | HEART RATE: 81 BPM | DIASTOLIC BLOOD PRESSURE: 85 MMHG | OXYGEN SATURATION: 97 %

## 2024-07-01 DIAGNOSIS — M19.90 ARTHRITIS: Primary | ICD-10-CM

## 2024-07-01 PROCEDURE — 99213 OFFICE O/P EST LOW 20 MIN: CPT | Performed by: INTERNAL MEDICINE

## 2024-07-01 PROCEDURE — 3074F SYST BP LT 130 MM HG: CPT | Performed by: INTERNAL MEDICINE

## 2024-07-01 PROCEDURE — 3079F DIAST BP 80-89 MM HG: CPT | Performed by: INTERNAL MEDICINE

## 2024-07-01 PROCEDURE — 3008F BODY MASS INDEX DOCD: CPT | Performed by: INTERNAL MEDICINE

## 2024-07-01 PROCEDURE — 4010F ACE/ARB THERAPY RXD/TAKEN: CPT | Performed by: INTERNAL MEDICINE

## 2024-07-01 RX ORDER — MELOXICAM 15 MG/1
15 TABLET ORAL DAILY PRN
Qty: 30 TABLET | Refills: 3 | Status: SHIPPED | OUTPATIENT
Start: 2024-07-01 | End: 2025-07-01

## 2024-07-01 ASSESSMENT — ENCOUNTER SYMPTOMS
NUMBNESS: 1
APNEA: 1
ENDOCRINE COMMENTS: NOCTURIA
DIZZINESS: 0
HEADACHES: 0
FATIGUE: 1
SHORTNESS OF BREATH: 1

## 2024-07-01 NOTE — PROGRESS NOTES
Subjective   Patient ID: Sergey South is a 48 y.o. female who presents for FUV.  Lupus  Associated symptoms include fatigue and numbness. Pertinent negatives include no headaches or rash.   Patient with positive JANAE and musculoskeletal symptoms of uncertain clinical significance.  Also has a history of obstructive sleep apnea, Herpes labialis requiring Valtrex as needed, rosacea, hypertension    Her initial visit was in November.  Her repeat JANAE came back negative and her KAREN has remained negative as well.  She did have a mild elevation of ESR again but her CRP was normal.  She did have elevations of her IgA level and a mild decrease in her IgM levels.  Her C4 level was also mildly elevated.  She was negative for antiphospholipid syndrome.    She had a recent colonoscopy and had a subcentimeter polyp in the cecum removed.    She had noted that she does get sinus and ear infections often.    The pain is always there but sometiems it's mild and she does not pay attention to it.  Her hands get sore, stiff and swollen.  She has had symptoms in her right ankle and right knee.  Occasionally the left lower extremity 2.    She will take ibuprofen 400 mg as needed and it does help with her discomfort.  She has been trying to lose weight again and has been doing hello fresh.  Will have soda and coffee.      Review of Systems   Constitutional:  Positive for fatigue.   HENT:          Canker sores, cold sores   Eyes:         Wears glasses, increase is eye watering   Respiratory:  Positive for apnea and shortness of breath.    Endocrine:        Nocturia   Skin:  Negative for rash.   Neurological:  Positive for numbness. Negative for dizziness and headaches.       Objective   Physical Exam  GEN: NAD A&O x3 appropriate affect  HENT: no enlarged glands or thyroid  EYES:  no conjunctival redness, eyelids normal  LYMPH: no cervical ymphadenopathy  SKIN: no rashes, psoriasis or nail pitting  PULSES: +radials  TENDER POINTS: 0/18    MSK:  Mild hypertrophic changes seen in the DIP and PIP no synovitis kyphoscoliosis of thoracic lumbar spine  Assessment/Plan   Positive HLA-B27 and has had positive/negative JANAE and negative KAREN panel.  Has had elevation of ESR but normal CRP negative RF/CCP.  Does have musculoskeletal symptoms with osteoarthritis.  Will have her do meloxicam.  Discussed to take with food and side effects of medication with her.

## 2024-07-08 ENCOUNTER — HOSPITAL ENCOUNTER (OUTPATIENT)
Dept: RADIOLOGY | Facility: CLINIC | Age: 49
Discharge: HOME | End: 2024-07-08
Payer: COMMERCIAL

## 2024-07-08 DIAGNOSIS — G37.9 CNS DEMYELINATION (MULTI): ICD-10-CM

## 2024-07-08 LAB
LABORATORY COMMENT REPORT: NORMAL
PATH REPORT.FINAL DX SPEC: NORMAL
PATH REPORT.GROSS SPEC: NORMAL
PATH REPORT.TOTAL CANCER: NORMAL

## 2024-07-08 PROCEDURE — 70553 MRI BRAIN STEM W/O & W/DYE: CPT

## 2024-07-08 PROCEDURE — A9575 INJ GADOTERATE MEGLUMI 0.1ML: HCPCS | Performed by: NURSE PRACTITIONER

## 2024-07-08 PROCEDURE — 2500000004 HC RX 250 GENERAL PHARMACY W/ HCPCS (ALT 636 FOR OP/ED): Performed by: NURSE PRACTITIONER

## 2024-07-08 PROCEDURE — 70553 MRI BRAIN STEM W/O & W/DYE: CPT | Performed by: RADIOLOGY

## 2024-07-08 RX ORDER — GADOTERATE MEGLUMINE 376.9 MG/ML
20 INJECTION INTRAVENOUS
Status: COMPLETED | OUTPATIENT
Start: 2024-07-08 | End: 2024-07-08

## 2024-07-09 ENCOUNTER — TELEPHONE (OUTPATIENT)
Dept: SURGERY | Facility: CLINIC | Age: 49
End: 2024-07-09
Payer: COMMERCIAL

## 2024-07-09 NOTE — TELEPHONE ENCOUNTER
----- Message from Prudencio Maldonado MD sent at 7/8/2024  4:35 PM EDT -----  Let patient know polyp was removed and nothing to worry about. A my chart reminder will be sent for a repeat colonoscopy in 5 years

## 2024-07-18 DIAGNOSIS — B00.1 HERPES LABIALIS: ICD-10-CM

## 2024-07-18 RX ORDER — VALACYCLOVIR HYDROCHLORIDE 1 G/1
TABLET, FILM COATED ORAL
Qty: 20 TABLET | Refills: 0 | Status: SHIPPED | OUTPATIENT
Start: 2024-07-18

## 2024-08-09 ENCOUNTER — LAB (OUTPATIENT)
Dept: LAB | Facility: LAB | Age: 49
End: 2024-08-09
Payer: COMMERCIAL

## 2024-08-09 DIAGNOSIS — E83.10 DISORDER OF IRON METABOLISM: ICD-10-CM

## 2024-08-09 LAB
FERRITIN SERPL-MCNC: 55 NG/ML (ref 8–150)
IRON SATN MFR SERPL: 15 % (ref 25–45)
IRON SERPL-MCNC: 49 UG/DL (ref 35–150)
TIBC SERPL-MCNC: 325 UG/DL (ref 240–445)
UIBC SERPL-MCNC: 276 UG/DL (ref 110–370)

## 2024-08-09 PROCEDURE — 36415 COLL VENOUS BLD VENIPUNCTURE: CPT

## 2024-08-12 DIAGNOSIS — I10 HYPERTENSION, UNSPECIFIED TYPE: ICD-10-CM

## 2024-08-12 DIAGNOSIS — E83.10 DISORDER OF IRON METABOLISM: Primary | ICD-10-CM

## 2024-08-12 DIAGNOSIS — E11.65 POORLY CONTROLLED TYPE 2 DIABETES MELLITUS (MULTI): ICD-10-CM

## 2024-08-12 RX ORDER — FERROUS SULFATE 325(65) MG
325 TABLET, DELAYED RELEASE (ENTERIC COATED) ORAL
Qty: 30 TABLET | Refills: 2 | Status: SHIPPED | OUTPATIENT
Start: 2024-08-12 | End: 2024-11-10

## 2024-08-12 RX ORDER — LOSARTAN POTASSIUM 50 MG/1
50 TABLET ORAL DAILY
Qty: 90 TABLET | Refills: 0 | Status: SHIPPED | OUTPATIENT
Start: 2024-08-12

## 2024-08-12 RX ORDER — INSULIN LISPRO 100 [IU]/ML
INJECTION, SUSPENSION SUBCUTANEOUS
Qty: 15 ML | Refills: 1 | Status: SHIPPED | OUTPATIENT
Start: 2024-08-12

## 2024-08-12 NOTE — PROGRESS NOTES
I am covering Dr. Arroyo for her vacation. The patient Ferritin leveled at 55, I placed three months of Ferrous Sulfate order and redraw blood in 3 months.

## 2024-08-19 ENCOUNTER — APPOINTMENT (OUTPATIENT)
Dept: PRIMARY CARE | Facility: CLINIC | Age: 49
End: 2024-08-19
Payer: COMMERCIAL

## 2024-08-19 VITALS
HEART RATE: 83 BPM | BODY MASS INDEX: 41.32 KG/M2 | TEMPERATURE: 97.1 F | HEIGHT: 63 IN | WEIGHT: 233.2 LBS | DIASTOLIC BLOOD PRESSURE: 80 MMHG | OXYGEN SATURATION: 96 % | SYSTOLIC BLOOD PRESSURE: 124 MMHG

## 2024-08-19 DIAGNOSIS — G45.9 TIA (TRANSIENT ISCHEMIC ATTACK): ICD-10-CM

## 2024-08-19 DIAGNOSIS — R93.0 ABNORMAL MRI OF HEAD: ICD-10-CM

## 2024-08-19 DIAGNOSIS — E11.65 POORLY CONTROLLED TYPE 2 DIABETES MELLITUS (MULTI): ICD-10-CM

## 2024-08-19 DIAGNOSIS — E78.00 HYPERCHOLESTEREMIA: ICD-10-CM

## 2024-08-19 DIAGNOSIS — G47.33 OBSTRUCTIVE SLEEP APNEA: ICD-10-CM

## 2024-08-19 DIAGNOSIS — Z00.00 ANNUAL PHYSICAL EXAM: Primary | ICD-10-CM

## 2024-08-19 DIAGNOSIS — I10 HYPERTENSION, UNSPECIFIED TYPE: ICD-10-CM

## 2024-08-19 LAB
POC FINGERSTICK BLOOD GLUCOSE: 123 MG/DL (ref 70–100)
POC HEMOGLOBIN A1C: 5.9 % (ref 4.2–6.5)

## 2024-08-19 PROCEDURE — 4010F ACE/ARB THERAPY RXD/TAKEN: CPT | Performed by: INTERNAL MEDICINE

## 2024-08-19 PROCEDURE — 3074F SYST BP LT 130 MM HG: CPT | Performed by: INTERNAL MEDICINE

## 2024-08-19 PROCEDURE — 1036F TOBACCO NON-USER: CPT | Performed by: INTERNAL MEDICINE

## 2024-08-19 PROCEDURE — 82962 GLUCOSE BLOOD TEST: CPT | Performed by: INTERNAL MEDICINE

## 2024-08-19 PROCEDURE — 99214 OFFICE O/P EST MOD 30 MIN: CPT | Performed by: INTERNAL MEDICINE

## 2024-08-19 PROCEDURE — 3079F DIAST BP 80-89 MM HG: CPT | Performed by: INTERNAL MEDICINE

## 2024-08-19 PROCEDURE — 99396 PREV VISIT EST AGE 40-64: CPT | Performed by: INTERNAL MEDICINE

## 2024-08-19 PROCEDURE — 83036 HEMOGLOBIN GLYCOSYLATED A1C: CPT | Performed by: INTERNAL MEDICINE

## 2024-08-19 PROCEDURE — 3008F BODY MASS INDEX DOCD: CPT | Performed by: INTERNAL MEDICINE

## 2024-08-19 ASSESSMENT — PATIENT HEALTH QUESTIONNAIRE - PHQ9
10. IF YOU CHECKED OFF ANY PROBLEMS, HOW DIFFICULT HAVE THESE PROBLEMS MADE IT FOR YOU TO DO YOUR WORK, TAKE CARE OF THINGS AT HOME, OR GET ALONG WITH OTHER PEOPLE: SOMEWHAT DIFFICULT
SUM OF ALL RESPONSES TO PHQ9 QUESTIONS 1 AND 2: 2
2. FEELING DOWN, DEPRESSED OR HOPELESS: SEVERAL DAYS
1. LITTLE INTEREST OR PLEASURE IN DOING THINGS: SEVERAL DAYS

## 2024-08-19 ASSESSMENT — ANXIETY QUESTIONNAIRES
5. BEING SO RESTLESS THAT IT IS HARD TO SIT STILL: SEVERAL DAYS
2. NOT BEING ABLE TO STOP OR CONTROL WORRYING: SEVERAL DAYS
6. BECOMING EASILY ANNOYED OR IRRITABLE: MORE THAN HALF THE DAYS
IF YOU CHECKED OFF ANY PROBLEMS ON THIS QUESTIONNAIRE, HOW DIFFICULT HAVE THESE PROBLEMS MADE IT FOR YOU TO DO YOUR WORK, TAKE CARE OF THINGS AT HOME, OR GET ALONG WITH OTHER PEOPLE: SOMEWHAT DIFFICULT
3. WORRYING TOO MUCH ABOUT DIFFERENT THINGS: SEVERAL DAYS
1. FEELING NERVOUS, ANXIOUS, OR ON EDGE: SEVERAL DAYS
GAD7 TOTAL SCORE: 8
7. FEELING AFRAID AS IF SOMETHING AWFUL MIGHT HAPPEN: SEVERAL DAYS
4. TROUBLE RELAXING: SEVERAL DAYS

## 2024-08-19 ASSESSMENT — ENCOUNTER SYMPTOMS
WHEEZING: 0
SINUS PAIN: 0
DIFFICULTY URINATING: 0
DIZZINESS: 0
ARTHRALGIAS: 0
SORE THROAT: 0
DIARRHEA: 0
COUGH: 0
BRUISES/BLEEDS EASILY: 0
FEVER: 0
HEADACHES: 0
PALPITATIONS: 0
BLOOD IN STOOL: 0
ABDOMINAL PAIN: 0
FATIGUE: 0
UNEXPECTED WEIGHT CHANGE: 0

## 2024-08-19 NOTE — PROGRESS NOTES
Subjective   Patient ID: Sergey South is a 48 y.o. female who presents for Annual Exam, Vitamin D Deficiency (Discuss taking [id number] plus is taking an OTC, should she be taking both ), and Diabetes (A1C, random).     Annual preventive visit  -Vaccinations reviewed up-to-date  - Screening mammogram obtained need to repeat in March 2025  - Need to proceed with complete blood work  - Screen for depression now controlled continue with Pristiq  - Morbid obesity counseled about BMI and weight loss  - Screening for colon cancer obtained in June 2020 4 repeat in 5 years    Follow-up  -Abnormal MRI findings underlying lacunar infarction no recurrence continue with aspirin daily  Follow-up neurology to rule out underlying MS  - Lacunar infarction continue aspirin daily  - Underlying obstructive sleep apnea patient need to follow-up sleep d clinic for CPAP initiation  - Recurrent herpes labialis continue with Valtrex as needed  - Osteoarthritis may use Tylenol for pain  --Depression on Pristiq 50 mg needs to continue new medication side effect needs to continue  - TIA, no recurrence now patient off Plavix after 21 days continue with aspirin daily as recommended no recurrent TIAs  -Diabetes continue with metformin 1 g twice a day continue low-carb diet controlled blood sugar record from home reviewed range 100 hemoglobin A1c 6 compensated counseled about weight loss  - Hypercholesterolemia continues atorvastatin  - Hypertension controlled  Follow-up 3 months                 Vitamin Deficiency  Diabetes  Pertinent negatives for hypoglycemia include no dizziness or headaches. Pertinent negatives for diabetes include no chest pain and no fatigue.          Review of Systems   Constitutional:  Negative for fatigue, fever and unexpected weight change.   HENT:  Negative for congestion, ear discharge, ear pain, mouth sores, sinus pain and sore throat.    Eyes:  Negative for visual disturbance.   Respiratory:  Negative for cough and  "wheezing.    Cardiovascular:  Negative for chest pain, palpitations and leg swelling.   Gastrointestinal:  Negative for abdominal pain, blood in stool and diarrhea.   Genitourinary:  Negative for difficulty urinating.   Musculoskeletal:  Negative for arthralgias.   Skin:  Negative for rash.   Neurological:  Negative for dizziness and headaches.   Hematological:  Does not bruise/bleed easily.   Psychiatric/Behavioral:  Negative for behavioral problems.    All other systems reviewed and are negative.      Objective   Lab Results   Component Value Date    HGBA1C 5.9 08/19/2024      /80   Pulse 83   Temp 36.2 °C (97.1 °F)   Ht 1.6 m (5' 3\")   Wt 106 kg (233 lb 3.2 oz)   SpO2 96%   BMI 41.31 kg/m²   Lab Results   Component Value Date    WBC 13.2 (H) 05/18/2023    HGB 14.3 05/18/2023    HCT 44.0 05/18/2023     05/18/2023    CHOL 139 04/24/2023    TRIG 143 04/24/2023    HDL 18.2 (A) 04/24/2023    ALT 23 05/18/2023    AST 18 05/18/2023     05/18/2023    K 4.4 05/18/2023     05/18/2023    CREATININE 0.67 05/18/2023    BUN 15 05/18/2023    CO2 25 05/18/2023    TSH 2.08 04/24/2023    INR CANCELED 04/24/2023    HGBA1C 5.9 08/19/2024     par   Physical Exam  Vitals and nursing note reviewed.   Constitutional:       Appearance: Normal appearance.   HENT:      Head: Normocephalic.      Nose: Nose normal.   Eyes:      Conjunctiva/sclera: Conjunctivae normal.      Pupils: Pupils are equal, round, and reactive to light.   Cardiovascular:      Rate and Rhythm: Regular rhythm.   Pulmonary:      Effort: Pulmonary effort is normal.      Breath sounds: Normal breath sounds.   Abdominal:      General: Abdomen is flat.      Palpations: Abdomen is soft.   Musculoskeletal:      Cervical back: Neck supple.   Skin:     General: Skin is warm.   Neurological:      General: No focal deficit present.      Mental Status: She is oriented to person, place, and time.   Psychiatric:         Mood and Affect: Mood normal. "         Assessment/Plan   Sergey was seen today for annual exam, vitamin d deficiency and diabetes.  Diagnoses and all orders for this visit:  Annual physical exam (Primary)  -     Albumin-Creatinine Ratio, Urine Random; Future  -     CBC and Auto Differential; Future  -     Comprehensive Metabolic Panel; Future  -     Hemoglobin A1C; Future  -     Lipid Panel; Future  -     Magnesium; Future  -     TSH with reflex to Free T4 if abnormal; Future  -     Vitamin D 25-Hydroxy,Total (for eval of Vitamin D levels); Future  Poorly controlled type 2 diabetes mellitus (Multi)  -     POCT fingerstick glucose manually resulted  -     POCT glycosylated hemoglobin (Hb A1C) manually resulted  Abnormal MRI of head  -     Referral to Neurology; Future  Hypertension, unspecified type  TIA (transient ischemic attack)  Hypercholesteremia  Obstructive sleep apnea  Other orders  -     Follow Up In Primary Care - Established   Annual preventive visit  -Vaccinations reviewed up-to-date  - Screening mammogram obtained need to repeat in March 2025  - Need to proceed with complete blood work  - Screen for depression now controlled continue with Pristiq  - Morbid obesity counseled about BMI and weight loss  - Screening for colon cancer obtained in June 2020 4 repeat in 5 years    Follow-up  -Abnormal MRI findings underlying lacunar infarction no recurrence continue with aspirin daily  Follow-up neurology to rule out underlying MS  - Lacunar infarction continue aspirin daily  - Underlying obstructive sleep apnea patient need to follow-up sleep d clinic for CPAP initiation  - Recurrent herpes labialis continue with Valtrex as needed  - Osteoarthritis may use Tylenol for pain  --Depression on Pristiq 50 mg needs to continue new medication side effect needs to continue  - TIA, no recurrence now patient off Plavix after 21 days continue with aspirin daily as recommended no recurrent TIAs  -Diabetes continue with metformin 1 g twice a day continue  low-carb diet controlled blood sugar record from home reviewed range 100 hemoglobin A1c 6 compensated counseled about weight loss  - Hypercholesterolemia continues atorvastatin  - Hypertension controlled  Follow-up 3 months

## 2024-09-16 DIAGNOSIS — G45.9 TIA (TRANSIENT ISCHEMIC ATTACK): ICD-10-CM

## 2024-09-16 DIAGNOSIS — B00.1 HERPES LABIALIS: ICD-10-CM

## 2024-09-16 RX ORDER — VALACYCLOVIR HYDROCHLORIDE 1 G/1
TABLET, FILM COATED ORAL
Qty: 20 TABLET | Refills: 0 | Status: SHIPPED | OUTPATIENT
Start: 2024-09-16

## 2024-09-16 RX ORDER — ATORVASTATIN CALCIUM 80 MG/1
80 TABLET, FILM COATED ORAL DAILY
Qty: 90 TABLET | Refills: 0 | Status: SHIPPED | OUTPATIENT
Start: 2024-09-16

## 2024-10-17 DIAGNOSIS — F32.A DEPRESSION, UNSPECIFIED DEPRESSION TYPE: ICD-10-CM

## 2024-10-17 RX ORDER — DESVENLAFAXINE 50 MG/1
50 TABLET, EXTENDED RELEASE ORAL DAILY
Qty: 30 TABLET | Refills: 0 | Status: SHIPPED | OUTPATIENT
Start: 2024-10-17

## 2024-10-23 DIAGNOSIS — B00.1 HERPES LABIALIS: ICD-10-CM

## 2024-10-25 RX ORDER — VALACYCLOVIR HYDROCHLORIDE 1 G/1
TABLET, FILM COATED ORAL
Qty: 20 TABLET | Refills: 0 | Status: SHIPPED | OUTPATIENT
Start: 2024-10-25

## 2024-11-07 ENCOUNTER — APPOINTMENT (OUTPATIENT)
Dept: NEUROLOGY | Facility: HOSPITAL | Age: 49
End: 2024-11-07
Payer: COMMERCIAL

## 2024-11-11 DIAGNOSIS — I10 HYPERTENSION, UNSPECIFIED TYPE: ICD-10-CM

## 2024-11-12 RX ORDER — LOSARTAN POTASSIUM 50 MG/1
50 TABLET ORAL DAILY
Qty: 90 TABLET | Refills: 1 | Status: SHIPPED | OUTPATIENT
Start: 2024-11-12

## 2024-11-19 DIAGNOSIS — G25.81 RLS (RESTLESS LEGS SYNDROME): ICD-10-CM

## 2024-11-19 DIAGNOSIS — E83.10 DISORDER OF IRON METABOLISM: ICD-10-CM

## 2024-11-19 RX ORDER — FERROUS SULFATE 325(65) MG
325 TABLET, DELAYED RELEASE (ENTERIC COATED) ORAL
Qty: 90 TABLET | Refills: 0 | Status: SHIPPED | OUTPATIENT
Start: 2024-11-19

## 2024-11-19 NOTE — TELEPHONE ENCOUNTER
Pharmacy requested refill of Ferrous sulfate 325 mg.    Called patient and asked her to complete her 3 month Iron labs at an  outpatient lab prior to the refill being sent. Patient stated she would go get them done.

## 2024-11-25 DIAGNOSIS — F32.A DEPRESSION, UNSPECIFIED DEPRESSION TYPE: ICD-10-CM

## 2024-11-25 RX ORDER — DESVENLAFAXINE 50 MG/1
50 TABLET, EXTENDED RELEASE ORAL DAILY
Qty: 30 TABLET | Refills: 0 | Status: SHIPPED | OUTPATIENT
Start: 2024-11-25

## 2024-11-26 DIAGNOSIS — B00.1 HERPES LABIALIS: ICD-10-CM

## 2024-11-26 RX ORDER — VALACYCLOVIR HYDROCHLORIDE 1 G/1
TABLET, FILM COATED ORAL
Qty: 20 TABLET | Refills: 0 | Status: SHIPPED | OUTPATIENT
Start: 2024-11-26

## 2024-12-03 DIAGNOSIS — E11.65 POORLY CONTROLLED TYPE 2 DIABETES MELLITUS (MULTI): ICD-10-CM

## 2024-12-03 RX ORDER — INSULIN LISPRO 100 [IU]/ML
INJECTION, SUSPENSION SUBCUTANEOUS
Qty: 15 ML | Refills: 0 | Status: SHIPPED | OUTPATIENT
Start: 2024-12-03

## 2024-12-16 DIAGNOSIS — G45.9 TIA (TRANSIENT ISCHEMIC ATTACK): ICD-10-CM

## 2024-12-17 RX ORDER — ATORVASTATIN CALCIUM 80 MG/1
80 TABLET, FILM COATED ORAL DAILY
Qty: 30 TABLET | Refills: 0 | Status: SHIPPED | OUTPATIENT
Start: 2024-12-17

## 2024-12-18 ENCOUNTER — TELEPHONE (OUTPATIENT)
Dept: SLEEP MEDICINE | Facility: HOSPITAL | Age: 49
End: 2024-12-18
Payer: COMMERCIAL

## 2024-12-18 DIAGNOSIS — G47.33 OSA (OBSTRUCTIVE SLEEP APNEA): ICD-10-CM

## 2024-12-18 NOTE — TELEPHONE ENCOUNTER
Pt requesting Full Face Mask order to send to [facility]. Called [facility], they do not have the blanket order on file, requesting specific order for a Full Face Mask. Pending order for provider.

## 2025-01-06 DIAGNOSIS — E11.65 POORLY CONTROLLED TYPE 2 DIABETES MELLITUS (MULTI): ICD-10-CM

## 2025-01-06 RX ORDER — INSULIN LISPRO 100 [IU]/ML
INJECTION, SUSPENSION SUBCUTANEOUS
Qty: 15 ML | Refills: 0 | Status: SHIPPED | OUTPATIENT
Start: 2025-01-06

## 2025-01-10 DIAGNOSIS — B00.1 HERPES LABIALIS: ICD-10-CM

## 2025-01-11 RX ORDER — VALACYCLOVIR HYDROCHLORIDE 1 G/1
TABLET, FILM COATED ORAL
Qty: 20 TABLET | Refills: 0 | Status: SHIPPED | OUTPATIENT
Start: 2025-01-11

## 2025-01-17 ENCOUNTER — LAB (OUTPATIENT)
Dept: LAB | Facility: LAB | Age: 50
End: 2025-01-17
Payer: COMMERCIAL

## 2025-01-17 DIAGNOSIS — Z00.00 ANNUAL PHYSICAL EXAM: ICD-10-CM

## 2025-01-17 LAB
25(OH)D3 SERPL-MCNC: 83 NG/ML (ref 30–100)
ALBUMIN SERPL BCP-MCNC: 4.4 G/DL (ref 3.4–5)
ALP SERPL-CCNC: 79 U/L (ref 33–110)
ALT SERPL W P-5'-P-CCNC: 32 U/L (ref 7–45)
ANION GAP SERPL CALC-SCNC: 13 MMOL/L (ref 10–20)
AST SERPL W P-5'-P-CCNC: 26 U/L (ref 9–39)
BASOPHILS # BLD AUTO: 0.05 X10*3/UL (ref 0–0.1)
BASOPHILS NFR BLD AUTO: 0.4 %
BILIRUB SERPL-MCNC: 0.2 MG/DL (ref 0–1.2)
BUN SERPL-MCNC: 19 MG/DL (ref 6–23)
CALCIUM SERPL-MCNC: 9.6 MG/DL (ref 8.6–10.3)
CHLORIDE SERPL-SCNC: 102 MMOL/L (ref 98–107)
CHOLEST SERPL-MCNC: 141 MG/DL (ref 0–199)
CHOLESTEROL/HDL RATIO: 3
CO2 SERPL-SCNC: 28 MMOL/L (ref 21–32)
CREAT SERPL-MCNC: 0.83 MG/DL (ref 0.5–1.05)
EGFRCR SERPLBLD CKD-EPI 2021: 85 ML/MIN/1.73M*2
EOSINOPHIL # BLD AUTO: 0.46 X10*3/UL (ref 0–0.7)
EOSINOPHIL NFR BLD AUTO: 3.4 %
ERYTHROCYTE [DISTWIDTH] IN BLOOD BY AUTOMATED COUNT: 14.9 % (ref 11.5–14.5)
EST. AVERAGE GLUCOSE BLD GHB EST-MCNC: 126 MG/DL
GLUCOSE SERPL-MCNC: 107 MG/DL (ref 74–99)
HBA1C MFR BLD: 6 %
HCT VFR BLD AUTO: 43.7 % (ref 36–46)
HDLC SERPL-MCNC: 47.3 MG/DL
HGB BLD-MCNC: 13.9 G/DL (ref 12–16)
IMM GRANULOCYTES # BLD AUTO: 0.04 X10*3/UL (ref 0–0.7)
IMM GRANULOCYTES NFR BLD AUTO: 0.3 % (ref 0–0.9)
LDLC SERPL CALC-MCNC: 75 MG/DL
LYMPHOCYTES # BLD AUTO: 5.45 X10*3/UL (ref 1.2–4.8)
LYMPHOCYTES NFR BLD AUTO: 40.3 %
MAGNESIUM SERPL-MCNC: 2.32 MG/DL (ref 1.6–2.4)
MCH RBC QN AUTO: 29.1 PG (ref 26–34)
MCHC RBC AUTO-ENTMCNC: 31.8 G/DL (ref 32–36)
MCV RBC AUTO: 91 FL (ref 80–100)
MONOCYTES # BLD AUTO: 0.81 X10*3/UL (ref 0.1–1)
MONOCYTES NFR BLD AUTO: 6 %
NEUTROPHILS # BLD AUTO: 6.71 X10*3/UL (ref 1.2–7.7)
NEUTROPHILS NFR BLD AUTO: 49.6 %
NON HDL CHOLESTEROL: 94 MG/DL (ref 0–149)
NRBC BLD-RTO: 0 /100 WBCS (ref 0–0)
PLATELET # BLD AUTO: 348 X10*3/UL (ref 150–450)
POTASSIUM SERPL-SCNC: 4.2 MMOL/L (ref 3.5–5.3)
PROT SERPL-MCNC: 7.3 G/DL (ref 6.4–8.2)
RBC # BLD AUTO: 4.78 X10*6/UL (ref 4–5.2)
SODIUM SERPL-SCNC: 139 MMOL/L (ref 136–145)
T4 FREE SERPL-MCNC: 0.77 NG/DL (ref 0.61–1.12)
TRIGL SERPL-MCNC: 96 MG/DL (ref 0–149)
TSH SERPL-ACNC: 5.4 MIU/L (ref 0.44–3.98)
VLDL: 19 MG/DL (ref 0–40)
WBC # BLD AUTO: 13.5 X10*3/UL (ref 4.4–11.3)

## 2025-01-17 PROCEDURE — 83036 HEMOGLOBIN GLYCOSYLATED A1C: CPT

## 2025-01-17 PROCEDURE — 82306 VITAMIN D 25 HYDROXY: CPT

## 2025-01-20 DIAGNOSIS — G45.9 TIA (TRANSIENT ISCHEMIC ATTACK): ICD-10-CM

## 2025-01-20 RX ORDER — ATORVASTATIN CALCIUM 80 MG/1
80 TABLET, FILM COATED ORAL DAILY
Qty: 10 TABLET | Refills: 0 | Status: SHIPPED | OUTPATIENT
Start: 2025-01-20

## 2025-01-21 ENCOUNTER — APPOINTMENT (OUTPATIENT)
Dept: NEUROLOGY | Facility: HOSPITAL | Age: 50
End: 2025-01-21
Payer: COMMERCIAL

## 2025-01-28 ENCOUNTER — APPOINTMENT (OUTPATIENT)
Dept: PRIMARY CARE | Facility: CLINIC | Age: 50
End: 2025-01-28
Payer: COMMERCIAL

## 2025-01-28 VITALS
HEART RATE: 84 BPM | BODY MASS INDEX: 42.55 KG/M2 | DIASTOLIC BLOOD PRESSURE: 82 MMHG | OXYGEN SATURATION: 97 % | SYSTOLIC BLOOD PRESSURE: 150 MMHG | TEMPERATURE: 96.4 F | WEIGHT: 240.2 LBS

## 2025-01-28 DIAGNOSIS — G47.33 OBSTRUCTIVE SLEEP APNEA: Primary | ICD-10-CM

## 2025-01-28 DIAGNOSIS — E11.65 UNCONTROLLED TYPE 2 DIABETES MELLITUS WITH HYPERGLYCEMIA: ICD-10-CM

## 2025-01-28 DIAGNOSIS — E78.00 HYPERCHOLESTEREMIA: ICD-10-CM

## 2025-01-28 DIAGNOSIS — E11.65 POORLY CONTROLLED TYPE 2 DIABETES MELLITUS (MULTI): ICD-10-CM

## 2025-01-28 DIAGNOSIS — I10 HYPERTENSION, UNSPECIFIED TYPE: ICD-10-CM

## 2025-01-28 DIAGNOSIS — E66.01 MORBID OBESITY WITH BMI OF 40.0-44.9, ADULT (MULTI): ICD-10-CM

## 2025-01-28 DIAGNOSIS — E55.9 VITAMIN D DEFICIENCY: ICD-10-CM

## 2025-01-28 DIAGNOSIS — F32.A DEPRESSION, UNSPECIFIED DEPRESSION TYPE: ICD-10-CM

## 2025-01-28 DIAGNOSIS — G45.9 TIA (TRANSIENT ISCHEMIC ATTACK): ICD-10-CM

## 2025-01-28 PROCEDURE — 3044F HG A1C LEVEL LT 7.0%: CPT | Performed by: INTERNAL MEDICINE

## 2025-01-28 PROCEDURE — 1036F TOBACCO NON-USER: CPT | Performed by: INTERNAL MEDICINE

## 2025-01-28 PROCEDURE — 4010F ACE/ARB THERAPY RXD/TAKEN: CPT | Performed by: INTERNAL MEDICINE

## 2025-01-28 PROCEDURE — 99214 OFFICE O/P EST MOD 30 MIN: CPT | Performed by: INTERNAL MEDICINE

## 2025-01-28 PROCEDURE — 3048F LDL-C <100 MG/DL: CPT | Performed by: INTERNAL MEDICINE

## 2025-01-28 PROCEDURE — 3079F DIAST BP 80-89 MM HG: CPT | Performed by: INTERNAL MEDICINE

## 2025-01-28 PROCEDURE — 3077F SYST BP >= 140 MM HG: CPT | Performed by: INTERNAL MEDICINE

## 2025-01-28 RX ORDER — INSULIN LISPRO 100 [IU]/ML
INJECTION, SUSPENSION SUBCUTANEOUS
Qty: 15 ML | Refills: 2 | Status: CANCELLED | OUTPATIENT
Start: 2025-01-28

## 2025-01-28 RX ORDER — MELATONIN 5 MG
CAPSULE ORAL
COMMUNITY

## 2025-01-28 RX ORDER — METFORMIN HYDROCHLORIDE 1000 MG/1
1000 TABLET ORAL
Qty: 180 TABLET | Refills: 1 | Status: CANCELLED | OUTPATIENT
Start: 2025-01-28 | End: 2026-01-28

## 2025-01-28 RX ORDER — ATORVASTATIN CALCIUM 80 MG/1
80 TABLET, FILM COATED ORAL DAILY
Qty: 90 TABLET | Refills: 1 | Status: SHIPPED | OUTPATIENT
Start: 2025-01-28

## 2025-01-28 RX ORDER — DESVENLAFAXINE 50 MG/1
50 TABLET, EXTENDED RELEASE ORAL DAILY
Qty: 90 TABLET | Refills: 1 | Status: CANCELLED | OUTPATIENT
Start: 2025-01-28

## 2025-01-28 RX ORDER — LOSARTAN POTASSIUM 50 MG/1
50 TABLET ORAL DAILY
Qty: 90 TABLET | Refills: 1 | Status: SHIPPED | OUTPATIENT
Start: 2025-01-28

## 2025-01-28 ASSESSMENT — ENCOUNTER SYMPTOMS
WHEEZING: 0
SINUS PAIN: 0
PALPITATIONS: 0
HEADACHES: 0
DIARRHEA: 0
DIZZINESS: 0
ABDOMINAL PAIN: 0
UNEXPECTED WEIGHT CHANGE: 0
FATIGUE: 0
BRUISES/BLEEDS EASILY: 0
BLOOD IN STOOL: 0
HYPERTENSION: 1
ARTHRALGIAS: 0
DIFFICULTY URINATING: 0
COUGH: 0
SORE THROAT: 0
FEVER: 0

## 2025-01-28 NOTE — PROGRESS NOTES
Subjective   Patient ID: Sergey South is a 49 y.o. female who presents for Diabetes, Hyperlipidemia, Hypertension, Hypothyroidism, Med Refill, and Results.    - Recent blood work plan of care reviewed  -Obstructive sleep apnea patient started on CPAP recently started to feel better urine frequency improved  - Diabetes improved hemoglobin A1c 6 continue with metformin low-carb diet exercise underlying obstructive sleep apnea morbid obesity BMI 42  I spent >15minutes minutes face to face with individial providing recommendations for nutrition choices and exercise plan to help achieve weight reduction.  Patient will benefit from Zepbound started 5 mg subcu weekly follow-up results closely  --lacunar infarction no recurrence continue with aspirin daily  - Recurrent herpes labialis continue with Valtrex as needed  - Osteoarthritis may use Tylenol for pain  --Depression on Pristiq 50 mg needs to continue new medication side effect needs to continue  - TIA, no recurrence now patient off Plavix after 21 days continue with aspirin daily as recommended no recurrent TIAs  -Diabetes continue with metformin 1 g twice a day continue low-carb diet controlled blood sugar record from home reviewed range 100 hemoglobin A1c 6 compensated counseled about weight loss  - Hypercholesterolemia continues atorvastatin  - Hypertension controlled  Follow-up 3 months                          Diabetes  Pertinent negatives for hypoglycemia include no dizziness or headaches. Pertinent negatives for diabetes include no chest pain and no fatigue.   Hyperlipidemia  Pertinent negatives include no chest pain.   Hypertension  Pertinent negatives include no chest pain, headaches or palpitations.   Med Refill  Pertinent negatives include no abdominal pain, arthralgias, chest pain, congestion, coughing, fatigue, fever, headaches, rash or sore throat.          Review of Systems   Constitutional:  Negative for fatigue, fever and unexpected weight change.    HENT:  Negative for congestion, ear discharge, ear pain, mouth sores, sinus pain and sore throat.    Eyes:  Negative for visual disturbance.   Respiratory:  Negative for cough and wheezing.    Cardiovascular:  Negative for chest pain, palpitations and leg swelling.   Gastrointestinal:  Negative for abdominal pain, blood in stool and diarrhea.   Genitourinary:  Negative for difficulty urinating.   Musculoskeletal:  Negative for arthralgias.   Skin:  Negative for rash.   Neurological:  Negative for dizziness and headaches.   Hematological:  Does not bruise/bleed easily.   Psychiatric/Behavioral:  Negative for behavioral problems.    All other systems reviewed and are negative.      Objective   Lab Results   Component Value Date    HGBA1C 6.0 (H) 01/17/2025      /82   Pulse 84   Temp 35.8 °C (96.4 °F)   Wt 109 kg (240 lb 3.2 oz)   SpO2 97%   BMI 42.55 kg/m²     Physical Exam  Vitals and nursing note reviewed.   Constitutional:       Appearance: Normal appearance.   HENT:      Head: Normocephalic.      Nose: Nose normal.   Eyes:      Conjunctiva/sclera: Conjunctivae normal.      Pupils: Pupils are equal, round, and reactive to light.   Cardiovascular:      Rate and Rhythm: Regular rhythm.   Pulmonary:      Effort: Pulmonary effort is normal.      Breath sounds: Normal breath sounds.   Abdominal:      General: Abdomen is flat.      Palpations: Abdomen is soft.   Musculoskeletal:      Cervical back: Neck supple.   Skin:     General: Skin is warm.   Neurological:      General: No focal deficit present.      Mental Status: She is oriented to person, place, and time.   Psychiatric:         Mood and Affect: Mood normal.         Assessment/Plan   Sergey was seen today for diabetes, hyperlipidemia, hypertension, hypothyroidism, med refill and results.  Diagnoses and all orders for this visit:  Obstructive sleep apnea (Primary)  -     tirzepatide, weight loss, (Zepbound) 5 mg/0.5 mL injection; Inject 5 mg under the  skin every 7 days.  TIA (transient ischemic attack)  -     atorvastatin (Lipitor) 80 mg tablet; Take 1 tablet (80 mg) by mouth once daily.  -     tirzepatide, weight loss, (Zepbound) 5 mg/0.5 mL injection; Inject 5 mg under the skin every 7 days.  Depression, unspecified depression type  Vitamin D deficiency  Poorly controlled type 2 diabetes mellitus (Multi)  -     tirzepatide, weight loss, (Zepbound) 5 mg/0.5 mL injection; Inject 5 mg under the skin every 7 days.  Hypertension, unspecified type  -     losartan (Cozaar) 50 mg tablet; Take 1 tablet (50 mg) by mouth once daily.  Uncontrolled type 2 diabetes mellitus with hyperglycemia  Morbid obesity with BMI of 40.0-44.9, adult (Multi)  -     tirzepatide, weight loss, (Zepbound) 5 mg/0.5 mL injection; Inject 5 mg under the skin every 7 days.  Hypercholesteremia  Other orders  -     Follow Up In Primary Care - Established; Future   - Recent blood work plan of care reviewed  -Obstructive sleep apnea patient started on CPAP recently started to feel better urine frequency improved  - Diabetes improved hemoglobin A1c 6 continue with metformin low-carb diet exercise underlying obstructive sleep apnea morbid obesity BMI 42  I spent >15minutes minutes face to face with individial providing recommendations for nutrition choices and exercise plan to help achieve weight reduction.  Patient will benefit from Zepbound started 5 mg subcu weekly follow-up results closely  --lacunar infarction no recurrence continue with aspirin daily  - Recurrent herpes labialis continue with Valtrex as needed  - Osteoarthritis may use Tylenol for pain  --Depression on Pristiq 50 mg needs to continue new medication side effect needs to continue  - TIA, no recurrence now patient off Plavix after 21 days continue with aspirin daily as recommended no recurrent TIAs  -Diabetes continue with metformin 1 g twice a day continue low-carb diet controlled blood sugar record from home reviewed range 100  hemoglobin A1c 6 compensated counseled about weight loss  - Hypercholesterolemia continues atorvastatin  - Hypertension controlled  Follow-up 3 months

## 2025-02-04 DIAGNOSIS — E66.01 MORBID OBESITY WITH BMI OF 40.0-44.9, ADULT (MULTI): ICD-10-CM

## 2025-02-04 DIAGNOSIS — E11.65 POORLY CONTROLLED TYPE 2 DIABETES MELLITUS (MULTI): ICD-10-CM

## 2025-02-04 DIAGNOSIS — G47.33 OBSTRUCTIVE SLEEP APNEA: ICD-10-CM

## 2025-02-04 DIAGNOSIS — G45.9 TIA (TRANSIENT ISCHEMIC ATTACK): ICD-10-CM

## 2025-02-10 DIAGNOSIS — B00.1 HERPES LABIALIS: ICD-10-CM

## 2025-02-10 RX ORDER — VALACYCLOVIR HYDROCHLORIDE 1 G/1
TABLET, FILM COATED ORAL
Qty: 20 TABLET | Refills: 0 | Status: SHIPPED | OUTPATIENT
Start: 2025-02-10

## 2025-02-13 DIAGNOSIS — E11.65 POORLY CONTROLLED TYPE 2 DIABETES MELLITUS (MULTI): ICD-10-CM

## 2025-02-14 RX ORDER — INSULIN LISPRO 100 [IU]/ML
INJECTION, SUSPENSION SUBCUTANEOUS
Qty: 15 ML | Refills: 0 | Status: SHIPPED | OUTPATIENT
Start: 2025-02-14

## 2025-03-05 ENCOUNTER — APPOINTMENT (OUTPATIENT)
Dept: PRIMARY CARE | Facility: CLINIC | Age: 50
End: 2025-03-05
Payer: COMMERCIAL

## 2025-03-11 DIAGNOSIS — B00.1 HERPES LABIALIS: ICD-10-CM

## 2025-03-11 RX ORDER — VALACYCLOVIR HYDROCHLORIDE 1 G/1
TABLET, FILM COATED ORAL
Qty: 20 TABLET | Refills: 0 | Status: SHIPPED | OUTPATIENT
Start: 2025-03-11

## 2025-03-17 DIAGNOSIS — E11.65 POORLY CONTROLLED TYPE 2 DIABETES MELLITUS (MULTI): ICD-10-CM

## 2025-03-17 RX ORDER — INSULIN LISPRO 100 [IU]/ML
INJECTION, SUSPENSION SUBCUTANEOUS
Qty: 6 ML | Refills: 0 | Status: SHIPPED | OUTPATIENT
Start: 2025-03-17

## 2025-03-27 ENCOUNTER — APPOINTMENT (OUTPATIENT)
Dept: PRIMARY CARE | Facility: CLINIC | Age: 50
End: 2025-03-27
Payer: COMMERCIAL

## 2025-03-27 DIAGNOSIS — U07.1 COVID-19: Primary | ICD-10-CM

## 2025-03-27 DIAGNOSIS — E66.01 MORBID OBESITY WITH BMI OF 40.0-44.9, ADULT (MULTI): ICD-10-CM

## 2025-03-27 DIAGNOSIS — G45.9 TIA (TRANSIENT ISCHEMIC ATTACK): ICD-10-CM

## 2025-03-27 DIAGNOSIS — G47.33 OBSTRUCTIVE SLEEP APNEA: ICD-10-CM

## 2025-03-27 DIAGNOSIS — E11.65 POORLY CONTROLLED TYPE 2 DIABETES MELLITUS (MULTI): ICD-10-CM

## 2025-03-27 PROCEDURE — 4010F ACE/ARB THERAPY RXD/TAKEN: CPT | Performed by: INTERNAL MEDICINE

## 2025-03-27 PROCEDURE — 3044F HG A1C LEVEL LT 7.0%: CPT | Performed by: INTERNAL MEDICINE

## 2025-03-27 PROCEDURE — 1036F TOBACCO NON-USER: CPT | Performed by: INTERNAL MEDICINE

## 2025-03-27 PROCEDURE — 3048F LDL-C <100 MG/DL: CPT | Performed by: INTERNAL MEDICINE

## 2025-03-27 PROCEDURE — 99214 OFFICE O/P EST MOD 30 MIN: CPT | Performed by: INTERNAL MEDICINE

## 2025-03-27 RX ORDER — NIRMATRELVIR AND RITONAVIR 300-100 MG
3 KIT ORAL 2 TIMES DAILY
Qty: 30 TABLET | Refills: 0 | Status: SHIPPED | OUTPATIENT
Start: 2025-03-27 | End: 2025-04-01

## 2025-03-27 ASSESSMENT — ENCOUNTER SYMPTOMS
UNEXPECTED WEIGHT CHANGE: 0
ABDOMINAL PAIN: 0
DIZZINESS: 0
SORE THROAT: 0
BLOOD IN STOOL: 0
WHEEZING: 0
COUGH: 0
PALPITATIONS: 0
DIFFICULTY URINATING: 0
HEADACHES: 0
DIARRHEA: 0
FEVER: 0
FATIGUE: 0
ARTHRALGIAS: 0
BRUISES/BLEEDS EASILY: 0
SINUS PAIN: 0

## 2025-03-27 NOTE — PROGRESS NOTES
Subjective   Patient ID: Sergey South is a 49 y.o. female who presents for Virtual Visit (Covid positive, ), Diabetes, and Med Refill.  Virtual or Telephone Consent    An interactive audio and video telecommunication system which permits real time communications between the patient (at the originating site) and provider (at the distant site) was utilized to provide this telehealth service.   Verbal consent was requested and obtained from Sergey South on this date, 03/27/25 for a telehealth visit and the patient's location was confirmed at the time of the visit.   - Patient comes today for checkup found to have COVID-like symptoms patient result to obtain COVID test and changing the visit to virtual visit  - COVID-19 came back positive  Patient being sick for the last 3 days sore throat cough and ear ache,  Patient comes about bedrest fluid intake isolation CDC guidelines patient high risk will benefit from Paxlovid given new prescription for Paxlovid instruction about the use  - Needs to hold atorvastatin for 7 days we will follow-up with patient closely as needed  - In regards to her diabetes patient concerned about not losing weight no side effects from Zepbound  Patient comes about diet controlled need to increase Zepbound to take 7.5 mg weekly new prescription provided no medication side effect reevaluate patient in 4 weeks  -Obstructive sleep apnea patient started on CPAP recently started to feel better urine frequency improved  ---lacunar infarction no recurrence continue with aspirin daily  - Recurrent herpes labialis continue with Valtrex as needed  - Osteoarthritis may use Tylenol for pain  --Depression on Pristiq 50 mg needs to continue new medication side effect needs to continue  - TIA, no recurrence now patient off Plavix after 21 days continue with aspirin daily as recommended no recurrent TIAs  -Diabetes continue with metformin 1 g twice a day continue low-carb diet controlled blood sugar record  from home reviewed range 100 hemoglobin A1c 6 compensated counseled about weight loss  - Hypercholesterolemia continues atorvastatin  - Hypertension controlled  Follow-up 4 weeks           Review of Systems   Constitutional:  Negative for fatigue, fever and unexpected weight change.   HENT:  Negative for congestion, ear discharge, ear pain, mouth sores, sinus pain and sore throat.    Eyes:  Negative for visual disturbance.   Respiratory:  Negative for cough and wheezing.    Cardiovascular:  Negative for chest pain, palpitations and leg swelling.   Gastrointestinal:  Negative for abdominal pain, blood in stool and diarrhea.   Genitourinary:  Negative for difficulty urinating.   Musculoskeletal:  Negative for arthralgias.   Skin:  Negative for rash.   Neurological:  Negative for dizziness and headaches.   Hematological:  Does not bruise/bleed easily.   Psychiatric/Behavioral:  Negative for behavioral problems.    All other systems reviewed and are negative.      Objective   There were no vitals taken for this visit.    Physical Exam  Vitals and nursing note reviewed.   Constitutional:       General: She is not in acute distress.     Appearance: Normal appearance. She is not ill-appearing, toxic-appearing or diaphoretic.   Pulmonary:      Effort: Pulmonary effort is normal.   Neurological:      Mental Status: She is alert.   Psychiatric:         Mood and Affect: Mood normal.         Assessment/Plan   Diagnoses and all orders for this visit:  COVID-19  -     nirmatrelvir-ritonavir (Paxlovid) 300 mg (150 mg x 2)-100 mg tablet therapy pack; Take 3 tablets by mouth 2 times a day for 5 days. Follow the instructions on the package  TIA (transient ischemic attack)  -     tirzepatide, weight loss, (Zepbound) 7.5 mg/0.5 mL injection; Inject 7.5 mg under the skin every 7 days.  Poorly controlled type 2 diabetes mellitus (Multi)  -     tirzepatide, weight loss, (Zepbound) 7.5 mg/0.5 mL injection; Inject 7.5 mg under the skin  every 7 days.  Obstructive sleep apnea  Morbid obesity with BMI of 40.0-44.9, adult (Multi)  -     tirzepatide, weight loss, (Zepbound) 7.5 mg/0.5 mL injection; Inject 7.5 mg under the skin every 7 days.  Other orders  -     Follow Up In Primary Care - Established; Future  - Patient comes today for checkup found to have COVID-like symptoms patient result to obtain COVID test and changing the visit to virtual visit  - COVID-19 came back positive  Patient being sick for the last 3 days sore throat cough and ear ache,  Patient comes about bedrest fluid intake isolation CDC guidelines patient high risk will benefit from Paxlovid given new prescription for Paxlovid instruction about the use  - Needs to hold atorvastatin for 7 days we will follow-up with patient closely as needed  - In regards to her diabetes patient concerned about not losing weight no side effects from Zepbound  Patient comes about diet controlled need to increase Zepbound to take 7.5 mg weekly new prescription provided no medication side effect reevaluate patient in 4 weeks  -Obstructive sleep apnea patient started on CPAP recently started to feel better urine frequency improved  ---lacunar infarction no recurrence continue with aspirin daily  - Recurrent herpes labialis continue with Valtrex as needed  - Osteoarthritis may use Tylenol for pain  --Depression on Pristiq 50 mg needs to continue new medication side effect needs to continue  - TIA, no recurrence now patient off Plavix after 21 days continue with aspirin daily as recommended no recurrent TIAs  -Diabetes continue with metformin 1 g twice a day continue low-carb diet controlled blood sugar record from home reviewed range 100 hemoglobin A1c 6 compensated counseled about weight loss  - Hypercholesterolemia continues atorvastatin  - Hypertension controlled  Follow-up 4 weeks

## 2025-04-07 DIAGNOSIS — E11.65 POORLY CONTROLLED TYPE 2 DIABETES MELLITUS (MULTI): ICD-10-CM

## 2025-04-07 DIAGNOSIS — B00.1 HERPES LABIALIS: ICD-10-CM

## 2025-04-07 RX ORDER — INSULIN LISPRO 100 [IU]/ML
INJECTION, SUSPENSION SUBCUTANEOUS
Qty: 6 ML | Refills: 0 | Status: SHIPPED | OUTPATIENT
Start: 2025-04-07 | End: 2025-04-08 | Stop reason: SDUPTHER

## 2025-04-07 RX ORDER — VALACYCLOVIR HYDROCHLORIDE 1 G/1
TABLET, FILM COATED ORAL
Qty: 20 TABLET | Refills: 0 | Status: SHIPPED | OUTPATIENT
Start: 2025-04-07

## 2025-04-08 ENCOUNTER — TELEPHONE (OUTPATIENT)
Dept: PRIMARY CARE | Facility: CLINIC | Age: 50
End: 2025-04-08
Payer: COMMERCIAL

## 2025-04-08 DIAGNOSIS — E11.65 POORLY CONTROLLED TYPE 2 DIABETES MELLITUS (MULTI): ICD-10-CM

## 2025-04-08 RX ORDER — INSULIN LISPRO 100 [IU]/ML
INJECTION, SUSPENSION SUBCUTANEOUS
Qty: 15 ML | Refills: 0 | Status: SHIPPED | OUTPATIENT
Start: 2025-04-08

## 2025-04-08 NOTE — TELEPHONE ENCOUNTER
Pt called upset that she paid $35 for 2 Insulin Pens-    I called pharmacy to confirm-  Usually she gets 5 Pens for $35.

## 2025-04-15 ENCOUNTER — APPOINTMENT (OUTPATIENT)
Dept: PRIMARY CARE | Facility: CLINIC | Age: 50
End: 2025-04-15
Payer: COMMERCIAL

## 2025-04-15 VITALS
TEMPERATURE: 97 F | BODY MASS INDEX: 41.49 KG/M2 | OXYGEN SATURATION: 97 % | WEIGHT: 234.2 LBS | HEART RATE: 88 BPM | SYSTOLIC BLOOD PRESSURE: 134 MMHG | DIASTOLIC BLOOD PRESSURE: 92 MMHG

## 2025-04-15 DIAGNOSIS — F32.A DEPRESSION, UNSPECIFIED DEPRESSION TYPE: ICD-10-CM

## 2025-04-15 DIAGNOSIS — E66.01 MORBID OBESITY WITH BMI OF 40.0-44.9, ADULT (MULTI): Primary | ICD-10-CM

## 2025-04-15 DIAGNOSIS — G45.9 TIA (TRANSIENT ISCHEMIC ATTACK): ICD-10-CM

## 2025-04-15 DIAGNOSIS — E11.65 UNCONTROLLED TYPE 2 DIABETES MELLITUS WITH HYPERGLYCEMIA: ICD-10-CM

## 2025-04-15 DIAGNOSIS — I10 HYPERTENSION, UNSPECIFIED TYPE: ICD-10-CM

## 2025-04-15 PROCEDURE — 3080F DIAST BP >= 90 MM HG: CPT | Performed by: INTERNAL MEDICINE

## 2025-04-15 PROCEDURE — 3048F LDL-C <100 MG/DL: CPT | Performed by: INTERNAL MEDICINE

## 2025-04-15 PROCEDURE — 99214 OFFICE O/P EST MOD 30 MIN: CPT | Performed by: INTERNAL MEDICINE

## 2025-04-15 PROCEDURE — 1036F TOBACCO NON-USER: CPT | Performed by: INTERNAL MEDICINE

## 2025-04-15 PROCEDURE — 4010F ACE/ARB THERAPY RXD/TAKEN: CPT | Performed by: INTERNAL MEDICINE

## 2025-04-15 PROCEDURE — 3075F SYST BP GE 130 - 139MM HG: CPT | Performed by: INTERNAL MEDICINE

## 2025-04-15 PROCEDURE — 3044F HG A1C LEVEL LT 7.0%: CPT | Performed by: INTERNAL MEDICINE

## 2025-04-15 RX ORDER — LOSARTAN POTASSIUM 100 MG/1
100 TABLET ORAL DAILY
Qty: 90 TABLET | Refills: 3 | Status: SHIPPED | OUTPATIENT
Start: 2025-04-15 | End: 2026-04-15

## 2025-04-15 ASSESSMENT — ENCOUNTER SYMPTOMS
BRUISES/BLEEDS EASILY: 0
SINUS PAIN: 0
WHEEZING: 0
FEVER: 0
DIFFICULTY URINATING: 0
ABDOMINAL PAIN: 0
PALPITATIONS: 0
FATIGUE: 0
SORE THROAT: 0
HYPERTENSION: 1
UNEXPECTED WEIGHT CHANGE: 0
DIZZINESS: 0
HEADACHES: 0
DIARRHEA: 0
ARTHRALGIAS: 0
COUGH: 0
BLOOD IN STOOL: 0

## 2025-04-15 NOTE — PROGRESS NOTES
Subjective   Patient ID: Sergey South is a 49 y.o. female who presents for Hyperlipidemia, Hypertension, Diabetes, and Medication Problem (Do not play with her meds anymore and she cannot afford weight loss medication you have her on ).    Recent blood work and consultation reviewed  -Patient was not able to  Zepbound due to insurance coverage and cost patient discontinued tobacco  - Obesity patient counseled about weight loss low-carb diet BMI 41.4 continue monitoring  -Diabetes controlled continue with current insulin  - Uncontrolled hypertension patient blood pressure record from home range 145/95  Patient needs a goal blood pressure 120/80  Need to increase losartan to take 100 mg daily  Continue low-salt diet continue with weight loss  - Patient recently treated from COVID improved with Paxlovid  -Obstructive sleep apnea patient started on CPAP recently started to feel better urine frequency improved  ---lacunar infarction no recurrence continue with aspirin daily  - Recurrent herpes labialis continue with Valtrex as needed  - Osteoarthritis may use Tylenol for pain  --Depression on Pristiq 50 mg needs to continue new medication side effect needs to continue  - TIA, no recurrence, no recurrence continue with aspirin daily  -Diabetes continue with metformin 1 g twice a day continue low-carb diet controlled blood sugar record from home reviewed range 100 hemoglobin A1c 6 compensated counseled about weight loss  - Hypercholesterolemia continues atorvastatin  Follow-up 3 months    Hyperlipidemia  Pertinent negatives include no chest pain.   Hypertension  Pertinent negatives include no chest pain, headaches or palpitations.   Diabetes  Pertinent negatives for hypoglycemia include no dizziness or headaches. Pertinent negatives for diabetes include no chest pain and no fatigue.          Review of Systems   Constitutional:  Negative for fatigue, fever and unexpected weight change.   HENT:  Negative for congestion,  ear discharge, ear pain, mouth sores, sinus pain and sore throat.    Eyes:  Negative for visual disturbance.   Respiratory:  Negative for cough and wheezing.    Cardiovascular:  Negative for chest pain, palpitations and leg swelling.   Gastrointestinal:  Negative for abdominal pain, blood in stool and diarrhea.   Genitourinary:  Negative for difficulty urinating.   Musculoskeletal:  Negative for arthralgias.   Skin:  Negative for rash.   Neurological:  Negative for dizziness and headaches.   Hematological:  Does not bruise/bleed easily.   Psychiatric/Behavioral:  Negative for behavioral problems.    All other systems reviewed and are negative.      Objective   Lab Results   Component Value Date    HGBA1C 6.0 (H) 01/17/2025      BP (!) 134/92   Pulse 88   Temp 36.1 °C (97 °F)   Wt 106 kg (234 lb 3.2 oz)   SpO2 97%   BMI 41.49 kg/m²     Physical Exam  Vitals and nursing note reviewed.   Constitutional:       Appearance: Normal appearance.   HENT:      Head: Normocephalic.      Nose: Nose normal.   Eyes:      Conjunctiva/sclera: Conjunctivae normal.      Pupils: Pupils are equal, round, and reactive to light.   Cardiovascular:      Rate and Rhythm: Regular rhythm.   Pulmonary:      Effort: Pulmonary effort is normal.      Breath sounds: Normal breath sounds.   Abdominal:      General: Abdomen is flat.      Palpations: Abdomen is soft.   Musculoskeletal:      Cervical back: Neck supple.   Skin:     General: Skin is warm.   Neurological:      General: No focal deficit present.      Mental Status: She is oriented to person, place, and time.   Psychiatric:         Mood and Affect: Mood normal.         Assessment/Plan   Sergey was seen today for hyperlipidemia, hypertension, diabetes and medication problem.  Diagnoses and all orders for this visit:  Morbid obesity with BMI of 40.0-44.9, adult (Multi) (Primary)  Hypertension, unspecified type  -     losartan (Cozaar) 100 mg tablet; Take 1 tablet (100 mg) by mouth once  daily.  TIA (transient ischemic attack)  Uncontrolled type 2 diabetes mellitus with hyperglycemia  Depression, unspecified depression type  Other orders  -     Follow Up In Primary Care - Established; Future   Recent blood work and consultation reviewed  -Patient was not able to  Zepbound due to insurance coverage and cost patient discontinued tobacco  - Obesity patient counseled about weight loss low-carb diet BMI 41.4 continue monitoring  -Diabetes controlled continue with current insulin  - Uncontrolled hypertension patient blood pressure record from home range 145/95  Patient needs a goal blood pressure 120/80  Need to increase losartan to take 100 mg daily  Continue low-salt diet continue with weight loss  - Patient recently treated from COVID improved with Paxlovid  -Obstructive sleep apnea patient started on CPAP recently started to feel better urine frequency improved  ---lacunar infarction no recurrence continue with aspirin daily  - Recurrent herpes labialis continue with Valtrex as needed  - Osteoarthritis may use Tylenol for pain  --Depression on Pristiq 50 mg needs to continue new medication side effect needs to continue  - TIA, no recurrence, no recurrence continue with aspirin daily  -Diabetes continue with metformin 1 g twice a day continue low-carb diet controlled blood sugar record from home reviewed range 100 hemoglobin A1c 6 compensated counseled about weight loss  - Hypercholesterolemia continues atorvastatin  Follow-up 3 months

## 2025-04-18 DIAGNOSIS — F32.A DEPRESSION, UNSPECIFIED DEPRESSION TYPE: ICD-10-CM

## 2025-04-18 RX ORDER — DESVENLAFAXINE 50 MG/1
50 TABLET, EXTENDED RELEASE ORAL DAILY
Qty: 30 TABLET | Refills: 0 | Status: SHIPPED | OUTPATIENT
Start: 2025-04-18

## 2025-05-08 DIAGNOSIS — G45.9 TIA (TRANSIENT ISCHEMIC ATTACK): ICD-10-CM

## 2025-05-08 RX ORDER — ATORVASTATIN CALCIUM 80 MG/1
80 TABLET, FILM COATED ORAL DAILY
Qty: 90 TABLET | Refills: 0 | Status: SHIPPED | OUTPATIENT
Start: 2025-05-08

## 2025-05-12 DIAGNOSIS — B00.1 HERPES LABIALIS: ICD-10-CM

## 2025-05-13 RX ORDER — VALACYCLOVIR HYDROCHLORIDE 1 G/1
TABLET, FILM COATED ORAL
Qty: 20 TABLET | Refills: 0 | Status: SHIPPED | OUTPATIENT
Start: 2025-05-13

## 2025-05-15 DIAGNOSIS — F32.A DEPRESSION, UNSPECIFIED DEPRESSION TYPE: ICD-10-CM

## 2025-05-15 RX ORDER — DESVENLAFAXINE 50 MG/1
50 TABLET, EXTENDED RELEASE ORAL DAILY
Qty: 30 TABLET | Refills: 0 | Status: SHIPPED | OUTPATIENT
Start: 2025-05-15

## 2025-05-19 DIAGNOSIS — E11.65 POORLY CONTROLLED TYPE 2 DIABETES MELLITUS (MULTI): ICD-10-CM

## 2025-05-19 RX ORDER — INSULIN LISPRO 100 [IU]/ML
INJECTION, SUSPENSION SUBCUTANEOUS
Qty: 15 ML | Refills: 0 | Status: SHIPPED | OUTPATIENT
Start: 2025-05-19

## 2025-05-28 ENCOUNTER — APPOINTMENT (OUTPATIENT)
Dept: SLEEP MEDICINE | Facility: CLINIC | Age: 50
End: 2025-05-28
Payer: COMMERCIAL

## 2025-06-16 DIAGNOSIS — E11.65 UNCONTROLLED TYPE 2 DIABETES MELLITUS WITH HYPERGLYCEMIA: ICD-10-CM

## 2025-06-16 RX ORDER — METFORMIN HYDROCHLORIDE 1000 MG/1
1000 TABLET ORAL
Qty: 180 TABLET | Refills: 1 | Status: SHIPPED | OUTPATIENT
Start: 2025-06-16 | End: 2026-06-16

## 2025-06-19 DIAGNOSIS — B00.1 HERPES LABIALIS: ICD-10-CM

## 2025-06-20 RX ORDER — VALACYCLOVIR HYDROCHLORIDE 1 G/1
TABLET, FILM COATED ORAL
Qty: 20 TABLET | Refills: 0 | Status: SHIPPED | OUTPATIENT
Start: 2025-06-20

## 2025-06-23 ENCOUNTER — APPOINTMENT (OUTPATIENT)
Dept: PRIMARY CARE | Facility: CLINIC | Age: 50
End: 2025-06-23
Payer: COMMERCIAL

## 2025-06-23 DIAGNOSIS — F32.A DEPRESSION, UNSPECIFIED DEPRESSION TYPE: ICD-10-CM

## 2025-06-23 RX ORDER — DESVENLAFAXINE 50 MG/1
50 TABLET, EXTENDED RELEASE ORAL DAILY
Qty: 90 TABLET | Refills: 0 | Status: SHIPPED | OUTPATIENT
Start: 2025-06-23

## 2025-07-09 DIAGNOSIS — E11.65 POORLY CONTROLLED TYPE 2 DIABETES MELLITUS (MULTI): ICD-10-CM

## 2025-07-09 RX ORDER — INSULIN LISPRO 100 [IU]/ML
INJECTION, SUSPENSION SUBCUTANEOUS
Qty: 15 ML | Refills: 0 | Status: SHIPPED | OUTPATIENT
Start: 2025-07-09

## 2025-07-15 ENCOUNTER — APPOINTMENT (OUTPATIENT)
Dept: PRIMARY CARE | Facility: CLINIC | Age: 50
End: 2025-07-15
Payer: COMMERCIAL

## 2025-07-15 VITALS
HEART RATE: 76 BPM | SYSTOLIC BLOOD PRESSURE: 120 MMHG | WEIGHT: 232 LBS | OXYGEN SATURATION: 99 % | BODY MASS INDEX: 41.1 KG/M2 | DIASTOLIC BLOOD PRESSURE: 74 MMHG | TEMPERATURE: 96.8 F

## 2025-07-15 DIAGNOSIS — E03.9 HYPOTHYROIDISM, UNSPECIFIED TYPE: ICD-10-CM

## 2025-07-15 DIAGNOSIS — G47.33 OBSTRUCTIVE SLEEP APNEA: Primary | ICD-10-CM

## 2025-07-15 DIAGNOSIS — R73.09 ABNORMAL BLOOD SUGAR: ICD-10-CM

## 2025-07-15 DIAGNOSIS — R53.83 OTHER FATIGUE: ICD-10-CM

## 2025-07-15 DIAGNOSIS — E66.01 MORBID OBESITY WITH BMI OF 40.0-44.9, ADULT (MULTI): ICD-10-CM

## 2025-07-15 DIAGNOSIS — E13.9 DIABETES 1.5, MANAGED AS TYPE 2 (MULTI): ICD-10-CM

## 2025-07-15 DIAGNOSIS — B00.1 HERPES LABIALIS: ICD-10-CM

## 2025-07-15 PROCEDURE — RXMED WILLOW AMBULATORY MEDICATION CHARGE

## 2025-07-15 PROCEDURE — 3074F SYST BP LT 130 MM HG: CPT | Performed by: INTERNAL MEDICINE

## 2025-07-15 PROCEDURE — 99214 OFFICE O/P EST MOD 30 MIN: CPT | Performed by: INTERNAL MEDICINE

## 2025-07-15 PROCEDURE — 3048F LDL-C <100 MG/DL: CPT | Performed by: INTERNAL MEDICINE

## 2025-07-15 PROCEDURE — 3078F DIAST BP <80 MM HG: CPT | Performed by: INTERNAL MEDICINE

## 2025-07-15 PROCEDURE — 3044F HG A1C LEVEL LT 7.0%: CPT | Performed by: INTERNAL MEDICINE

## 2025-07-15 PROCEDURE — 4010F ACE/ARB THERAPY RXD/TAKEN: CPT | Performed by: INTERNAL MEDICINE

## 2025-07-15 PROCEDURE — 1036F TOBACCO NON-USER: CPT | Performed by: INTERNAL MEDICINE

## 2025-07-15 RX ORDER — LEVOTHYROXINE SODIUM 50 UG/1
50 TABLET ORAL DAILY
Qty: 90 TABLET | Refills: 1 | Status: SHIPPED | OUTPATIENT
Start: 2025-07-15 | End: 2026-07-15

## 2025-07-15 RX ORDER — VALACYCLOVIR HYDROCHLORIDE 1 G/1
TABLET, FILM COATED ORAL
Qty: 20 TABLET | Refills: 0 | Status: SHIPPED | OUTPATIENT
Start: 2025-07-15

## 2025-07-15 ASSESSMENT — ENCOUNTER SYMPTOMS
BRUISES/BLEEDS EASILY: 0
UNEXPECTED WEIGHT CHANGE: 1
BLOOD IN STOOL: 0
FEVER: 0
PALPITATIONS: 0
HYPERTENSION: 1
DIZZINESS: 0
FATIGUE: 0
ABDOMINAL PAIN: 0
SORE THROAT: 0
DIARRHEA: 0
COUGH: 0
DIFFICULTY URINATING: 0
SINUS PAIN: 0
WHEEZING: 0
HEADACHES: 0
ARTHRALGIAS: 0

## 2025-07-15 NOTE — PROGRESS NOTES
Subjective   Patient ID: Sergey South is a 49 y.o. female who presents for Obesity (Discuss weight loss), Diabetes, Hyperlipidemia, Hypertension, and Breast Cancer Screening (Mammogram order).    - Obesity and hide BMI 41 underlying obstructive sleep apnea patient will be tried on Zepbound again 2.5 mg weekly new prescription sent to [place] counseled but medication side effect  - Borderline hypothyroid patient with hair changes and nail changes patient will be started empirically on levothyroxine 50 mcg daily and repeat thyroid function test in 3 months  - Diabetes monitor closely for hypoglycemia obtain hemoglobin A1c albumin creatinine ratio before next appointment  - Hypertension improved now on losartan 100 mg daily continue with weight loss  -Obstructive sleep apnea patient started on CPAP recently started to feel better urine frequency improved  ---lacunar infarction no recurrence continue with aspirin daily  - Recurrent herpes labialis continue with Valtrex as needed  - Osteoarthritis may use Tylenol for pain  --Depression on Pristiq 50 mg needs to continue new medication side effect needs to continue  - TIA, no recurrence, no recurrence continue with aspirin daily  -Diabetes continue with metformin 1 g twice a day continue low-carb diet controlled blood sugar record from home reviewed range 100 hemoglobin A1c 6 compensated counseled about weight loss continue with the start of Zepbound as recommended  - Hypercholesterolemia continues atorvastatin  Follow-up 3 months      Diabetes  Pertinent negatives for hypoglycemia include no dizziness or headaches. Pertinent negatives for diabetes include no chest pain and no fatigue.   Hyperlipidemia  Pertinent negatives include no chest pain.   Hypertension  Pertinent negatives include no chest pain, headaches or palpitations.          Review of Systems   Constitutional:  Positive for unexpected weight change. Negative for fatigue and fever.   HENT:  Negative  for congestion, ear discharge, ear pain, mouth sores, sinus pain and sore throat.    Eyes:  Negative for visual disturbance.   Respiratory:  Negative for cough and wheezing.    Cardiovascular:  Negative for chest pain, palpitations and leg swelling.   Gastrointestinal:  Negative for abdominal pain, blood in stool and diarrhea.   Genitourinary:  Negative for difficulty urinating.   Musculoskeletal:  Negative for arthralgias.   Skin:  Negative for rash.   Neurological:  Negative for dizziness and headaches.   Hematological:  Does not bruise/bleed easily.   Psychiatric/Behavioral:  Negative for behavioral problems.    All other systems reviewed and are negative.      Objective   Lab Results   Component Value Date    HGBA1C 6.0 (H) 01/17/2025      /74   Pulse 76   Temp 36 °C (96.8 °F)   Wt 105 kg (232 lb)   SpO2 99%   BMI 41.10 kg/m²   Lab Results   Component Value Date    WBC 13.5 (H) 01/17/2025    HGB 13.9 01/17/2025    HCT 43.7 01/17/2025     01/17/2025    CHOL 141 01/17/2025    TRIG 96 01/17/2025    HDL 47.3 01/17/2025    ALT 32 01/17/2025    AST 26 01/17/2025     01/17/2025    K 4.2 01/17/2025     01/17/2025    CREATININE 0.83 01/17/2025    BUN 19 01/17/2025    CO2 28 01/17/2025    TSH 5.40 (H) 01/17/2025    INR CANCELED 04/24/2023    HGBA1C 6.0 (H) 01/17/2025     par   Physical Exam  Vitals and nursing note reviewed.   Constitutional:       Appearance: Normal appearance. She is obese.   HENT:      Head: Normocephalic.      Nose: Nose normal.   Eyes:      Conjunctiva/sclera: Conjunctivae normal.      Pupils: Pupils are equal, round, and reactive to light.   Cardiovascular:      Rate and Rhythm: Regular rhythm.   Pulmonary:      Effort: Pulmonary effort is normal.      Breath sounds: Normal breath sounds.   Abdominal:      General: Abdomen is flat.      Palpations: Abdomen is soft.   Musculoskeletal:      Cervical back: Neck supple.   Skin:     General: Skin is warm.   Neurological:       General: No focal deficit present.      Mental Status: She is oriented to person, place, and time.   Psychiatric:         Mood and Affect: Mood normal.         Assessment/Plan   Sergey was seen today for obesity, diabetes, hyperlipidemia, hypertension and breast cancer screening.  Diagnoses and all orders for this visit:  Obstructive sleep apnea (Primary)  -     tirzepatide, weight loss, (Zepbound) 2.5 mg/0.5 mL injection; Inject 2.5 mg under the skin every 7 days.  Diabetes 1.5, managed as type 2 (Multi)  -     tirzepatide, weight loss, (Zepbound) 2.5 mg/0.5 mL injection; Inject 2.5 mg under the skin every 7 days.  -     Albumin-Creatinine Ratio, Urine Random; Future  -     Albumin-Creatinine Ratio, Urine Random  Herpes labialis  -     valACYclovir (Valtrex) 1 gram tablet; Take 2 tablets twice a day for 1 day.  An early sign of recurrent rash, as needed  Morbid obesity with BMI of 40.0-44.9, adult (Multi)  -     tirzepatide, weight loss, (Zepbound) 2.5 mg/0.5 mL injection; Inject 2.5 mg under the skin every 7 days.  Hypothyroidism, unspecified type  -     levothyroxine (Synthroid, Levoxyl) 50 mcg tablet; Take 1 tablet (50 mcg) by mouth early in the morning.. Take on an empty stomach at the same time each day, either 30 to 60 minutes prior to breakfast  Other fatigue  -     TSH with reflex to Free T4 if abnormal; Future  -     TSH with reflex to Free T4 if abnormal  Abnormal blood sugar  -     Hemoglobin A1C; Future  -     Hemoglobin A1C  Other orders  -     Follow Up In Primary Care - Established  -     Follow Up In Primary Care - Established; Future   - Obesity and hide BMI 41 underlying obstructive sleep apnea patient will be tried on Zepbound again 2.5 mg weekly new prescription sent to [facility] [facility] counseled but medication side effect  - Borderline hypothyroid patient with hair changes and nail changes patient will be started empirically on levothyroxine 50 mcg daily and repeat thyroid function test in 3  months  - Diabetes monitor closely for hypoglycemia obtain hemoglobin A1c albumin creatinine ratio before next appointment  - Hypertension improved now on losartan 100 mg daily continue with weight loss  -Obstructive sleep apnea patient started on CPAP recently started to feel better urine frequency improved  ---lacunar infarction no recurrence continue with aspirin daily  - Recurrent herpes labialis continue with Valtrex as needed  - Osteoarthritis may use Tylenol for pain  --Depression on Pristiq 50 mg needs to continue new medication side effect needs to continue  - TIA, no recurrence, no recurrence continue with aspirin daily  -Diabetes continue with metformin 1 g twice a day continue low-carb diet controlled blood sugar record from home reviewed range 100 hemoglobin A1c 6 compensated counseled about weight loss continue with the start of Zepbound as recommended  - Hypercholesterolemia continues atorvastatin  Follow-up 3 months

## 2025-07-16 ENCOUNTER — PHARMACY VISIT (OUTPATIENT)
Dept: PHARMACY | Facility: CLINIC | Age: 50
End: 2025-07-16
Payer: COMMERCIAL

## 2025-08-05 DIAGNOSIS — B00.1 HERPES LABIALIS: ICD-10-CM

## 2025-08-05 PROCEDURE — RXMED WILLOW AMBULATORY MEDICATION CHARGE

## 2025-08-05 RX ORDER — VALACYCLOVIR HYDROCHLORIDE 1 G/1
TABLET, FILM COATED ORAL
Qty: 20 TABLET | Refills: 0 | Status: SHIPPED | OUTPATIENT
Start: 2025-08-05

## 2025-08-07 ENCOUNTER — PHARMACY VISIT (OUTPATIENT)
Dept: PHARMACY | Facility: CLINIC | Age: 50
End: 2025-08-07
Payer: COMMERCIAL

## 2025-08-14 ENCOUNTER — APPOINTMENT (OUTPATIENT)
Dept: NEUROLOGY | Facility: CLINIC | Age: 50
End: 2025-08-14
Payer: COMMERCIAL

## 2025-08-19 ENCOUNTER — PHARMACY VISIT (OUTPATIENT)
Dept: PHARMACY | Facility: CLINIC | Age: 50
End: 2025-08-19
Payer: COMMERCIAL

## 2025-08-19 DIAGNOSIS — E11.65 POORLY CONTROLLED TYPE 2 DIABETES MELLITUS (MULTI): ICD-10-CM

## 2025-08-19 PROCEDURE — RXMED WILLOW AMBULATORY MEDICATION CHARGE

## 2025-08-19 RX ORDER — INSULIN LISPRO 100 [IU]/ML
INJECTION, SUSPENSION SUBCUTANEOUS
Qty: 15 ML | Refills: 0 | Status: SHIPPED | OUTPATIENT
Start: 2025-08-19

## 2025-09-05 DIAGNOSIS — E11.65 POORLY CONTROLLED TYPE 2 DIABETES MELLITUS (MULTI): ICD-10-CM

## 2025-09-05 RX ORDER — INSULIN LISPRO 100 [IU]/ML
INJECTION, SUSPENSION SUBCUTANEOUS
Qty: 15 ML | Refills: 0 | Status: CANCELLED | OUTPATIENT
Start: 2025-09-05

## 2025-10-14 ENCOUNTER — APPOINTMENT (OUTPATIENT)
Dept: PRIMARY CARE | Facility: CLINIC | Age: 50
End: 2025-10-14
Payer: COMMERCIAL